# Patient Record
Sex: FEMALE | Race: WHITE | Employment: UNEMPLOYED | URBAN - METROPOLITAN AREA
[De-identification: names, ages, dates, MRNs, and addresses within clinical notes are randomized per-mention and may not be internally consistent; named-entity substitution may affect disease eponyms.]

---

## 2017-03-23 ENCOUNTER — OFFICE VISIT (OUTPATIENT)
Dept: FAMILY MEDICINE CLINIC | Facility: CLINIC | Age: 62
End: 2017-03-23

## 2017-03-23 VITALS
RESPIRATION RATE: 20 BRPM | HEART RATE: 95 BPM | HEIGHT: 63 IN | OXYGEN SATURATION: 98 % | BODY MASS INDEX: 25.69 KG/M2 | DIASTOLIC BLOOD PRESSURE: 62 MMHG | TEMPERATURE: 101 F | SYSTOLIC BLOOD PRESSURE: 102 MMHG | WEIGHT: 145 LBS

## 2017-03-23 DIAGNOSIS — J02.9 ACUTE PHARYNGITIS, UNSPECIFIED ETIOLOGY: ICD-10-CM

## 2017-03-23 DIAGNOSIS — J02.9 SORE THROAT: Primary | ICD-10-CM

## 2017-03-23 DIAGNOSIS — J11.1 INFLUENZA-LIKE ILLNESS: ICD-10-CM

## 2017-03-23 LAB — CONTROL LINE PRESENT WITH A CLEAR BACKGROUND (YES/NO): YES YES/NO

## 2017-03-23 PROCEDURE — 87880 STREP A ASSAY W/OPTIC: CPT | Performed by: NURSE PRACTITIONER

## 2017-03-23 PROCEDURE — 99213 OFFICE O/P EST LOW 20 MIN: CPT | Performed by: NURSE PRACTITIONER

## 2017-03-23 PROCEDURE — 87081 CULTURE SCREEN ONLY: CPT | Performed by: NURSE PRACTITIONER

## 2017-03-23 RX ORDER — AMOXICILLIN 500 MG/1
CAPSULE ORAL
Qty: 20 CAPSULE | Refills: 0 | Status: SHIPPED | OUTPATIENT
Start: 2017-03-23 | End: 2017-03-27 | Stop reason: ALTCHOICE

## 2017-03-24 NOTE — PROGRESS NOTES
CHIEF COMPLAINT:   Patient presents with:  Sinus Problem: sinus pressure,bodyaches, chills,coughing and sore throat x 2 days      HPI:   Yessenia Reid is a 63year old female who presents for upper respiratory symptoms for  2 days.  Patient reports sore t Smoking Status: Never Smoker                      Smokeless Status: Never Used                        Alcohol Use: Yes           1.2 oz/week       2 Standard drinks or equivalent per week        REVIEW OF SYSTEMS:   GENERAL: feels well otherwise, normal - Will cover for strep pending culture due to exam/predominant sore throat. Discussed possibility of [address] if culture returns as neg.  - Comfort care as described in Patient Instructions.   - Discussed potential complications of flu/URIs including pneumon The tonsils and pharynx can become inflamed due to a cold or flu virus. Postnasal drip (excess mucus draining from the nasal cavity) can irritate the throat. It can also make the throat or tonsils more likely to be infected by bacteria.  Severe, untreated t Treatment depends on many factors. What is the likely cause? Is the problem recent? Does it keep coming back? In many cases, the best thing to do is to treat the symptoms, rest, and let the problem heal itself.  Antibiotics may help clear up some bacterial In some cases, tonsils need to be removed. This is often done as outpatient (same-day) surgery. Your healthcare provider may advise removing the tonsils in cases of:  · Several severe bouts of tonsillitis in a year.  “Severe” episodes include those that song · If symptoms are severe, rest at home for the first 2 to 3 days. When you resume activity, don't let yourself get too tired. · Avoid being exposed to cigarette smoke (yours or others’).   · You may use acetaminophen or ibuprofen to control pain and fever, © 4620-5047 [address], [address]. All rights reserved. This information is not intended as a substitute for professional medical care. Always follow your healthcare professional's instructions.

## 2017-03-24 NOTE — PATIENT INSTRUCTIONS
When You Have a Sore Throat  A sore throat can be painful. There are many reasons why you may have a sore throat. Your healthcare provider will work with you to find the cause of your sore throat. He or she will also find the best treatment for you. During the exam, your healthcare provider checks your ears, nose, and throat for problems.  He or she also checks for swelling in the neck, and may listen to your chest.  Possible tests  Other tests your healthcare provider may perform include:  · A throat If your sore throat is due to a bacterial infection, antibiotics may speed healing and prevent complications.  Although group A streptococcus (\"strep throat\" or GAS) is the major treatable infection for a sore throat, GAS causes only 5% to 15% of sore thr © 1035-2774 [address], [address] [place] [employer]. All rights reserved. This information is not intended as a substitute for professional medical care. Always follow your healthcare professional's instructions.         Viral U · Over-the-counter cold medicines will not shorten the length of time you’re sick, but they may be helpful for the following symptoms: cough, sore throat, and nasal and sinus congestion.  (Note: Do not use decongestants if you have high blood pressure.)  Fo

## 2017-03-25 ENCOUNTER — PATIENT MESSAGE (OUTPATIENT)
Dept: FAMILY MEDICINE CLINIC | Facility: CLINIC | Age: 62
End: 2017-03-25

## 2017-03-25 RX ORDER — CODEINE PHOSPHATE AND GUAIFENESIN 10; 100 MG/5ML; MG/5ML
5 SOLUTION ORAL EVERY 6 HOURS PRN
Qty: 120 ML | Refills: 0 | OUTPATIENT
Start: 2017-03-25 | End: 2017-03-27

## 2017-03-25 NOTE — TELEPHONE ENCOUNTER
Okay to send her in Cheratussin AC cough syrup to take prn cough particularly for sleeping. Warn her on drowsy effects.

## 2017-03-25 NOTE — TELEPHONE ENCOUNTER
From: Yessenia Reid  To: Jean Carlos Rodriguez  Sent: 3/25/2017 7:49 AM CDT  Subject: Non-Urgent Medical Question    Dayday Johnson,    I was seen at the walk in clinic Thursday night at the [facility].  The amoxicillin has relieved my burning

## 2017-03-27 ENCOUNTER — TELEPHONE (OUTPATIENT)
Dept: FAMILY MEDICINE CLINIC | Facility: CLINIC | Age: 62
End: 2017-03-27

## 2017-03-27 ENCOUNTER — OFFICE VISIT (OUTPATIENT)
Dept: FAMILY MEDICINE CLINIC | Facility: CLINIC | Age: 62
End: 2017-03-27

## 2017-03-27 VITALS
TEMPERATURE: 99 F | HEART RATE: 80 BPM | SYSTOLIC BLOOD PRESSURE: 110 MMHG | BODY MASS INDEX: 25.87 KG/M2 | WEIGHT: 146 LBS | DIASTOLIC BLOOD PRESSURE: 70 MMHG | RESPIRATION RATE: 16 BRPM | HEIGHT: 63 IN

## 2017-03-27 DIAGNOSIS — R05.9 COUGH: Primary | ICD-10-CM

## 2017-03-27 PROCEDURE — 99213 OFFICE O/P EST LOW 20 MIN: CPT | Performed by: FAMILY MEDICINE

## 2017-03-27 RX ORDER — METHYLPREDNISOLONE 4 MG/1
TABLET ORAL
Qty: 1 KIT | Refills: 0 | Status: SHIPPED | OUTPATIENT
Start: 2017-03-27 | End: 2017-06-14

## 2017-03-27 RX ORDER — ALBUTEROL SULFATE 90 UG/1
AEROSOL, METERED RESPIRATORY (INHALATION)
Qty: 1 INHALER | Refills: 0 | Status: SHIPPED | OUTPATIENT
Start: 2017-03-27 | End: 2017-03-27 | Stop reason: ALTCHOICE

## 2017-03-27 NOTE — PROGRESS NOTES
Naz Hong is a 63year old female. S:  Patient presents today with the following concerns:  · Began about 10 days ago with sore throat and voice sounding funny. Then throat felt like \"on fire\". Cough started and had fever at end of last week. Heart Attack Maternal Grandmother      Transient Ischemic Attack       REVIEW OF SYSTEMS:  GENERAL: feels well otherwise  SKIN: denies any unusual skin lesions  EYES:denies vision change  LUNGS: denies shortness of breath with exertion  CARDIOVASCULAR: den for her. I recommended fluids, steam, rest.  Follow-up if her symptoms worsen or do not improve. Did discuss with her that sometimes ACE inhibitors can cause a dry, irritating cough.   We will keep this in the back of our minds if her symptoms do not im

## 2017-03-27 NOTE — TELEPHONE ENCOUNTER
Spoke to pt and she informed me that [facility] sent a prescription to [facility] for .   However, ProAir is rather pricey and pt is requesting a new prescription to be called into [facility] for ProAir Respiclick that is less expensive and has a manufactu

## 2017-06-14 ENCOUNTER — PATIENT MESSAGE (OUTPATIENT)
Dept: FAMILY MEDICINE CLINIC | Facility: CLINIC | Age: 62
End: 2017-06-14

## 2017-06-14 ENCOUNTER — TELEPHONE (OUTPATIENT)
Dept: FAMILY MEDICINE CLINIC | Facility: CLINIC | Age: 62
End: 2017-06-14

## 2017-06-14 ENCOUNTER — OFFICE VISIT (OUTPATIENT)
Dept: FAMILY MEDICINE CLINIC | Facility: CLINIC | Age: 62
End: 2017-06-14

## 2017-06-14 VITALS
HEIGHT: 64 IN | WEIGHT: 148 LBS | TEMPERATURE: 98 F | BODY MASS INDEX: 25.27 KG/M2 | DIASTOLIC BLOOD PRESSURE: 70 MMHG | RESPIRATION RATE: 16 BRPM | SYSTOLIC BLOOD PRESSURE: 120 MMHG | HEART RATE: 120 BPM

## 2017-06-14 DIAGNOSIS — Z12.31 ENCOUNTER FOR SCREENING MAMMOGRAM FOR BREAST CANCER: Primary | ICD-10-CM

## 2017-06-14 DIAGNOSIS — Z00.00 ROUTINE GENERAL MEDICAL EXAMINATION AT A HEALTH CARE FACILITY: Primary | ICD-10-CM

## 2017-06-14 DIAGNOSIS — J02.9 SORE THROAT: Primary | ICD-10-CM
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
## 2017-06-14 DIAGNOSIS — Z13.29 SCREENING FOR THYROID DISORDER: ICD-10-CM

## 2017-06-14 DIAGNOSIS — E78.1 PURE HYPERGLYCERIDEMIA: ICD-10-CM

## 2017-06-14 DIAGNOSIS — Z13.0 SCREENING FOR DEFICIENCY ANEMIA: ICD-10-CM

## 2017-06-14 DIAGNOSIS — Z13.220 SCREENING FOR LIPOID DISORDERS: ICD-10-CM

## 2017-06-14 DIAGNOSIS — R05.9 COUGH: ICD-10-CM

## 2017-06-14 PROCEDURE — 99214 OFFICE O/P EST MOD 30 MIN: CPT | Performed by: PHYSICIAN ASSISTANT

## 2017-06-14 PROCEDURE — 87880 STREP A ASSAY W/OPTIC: CPT | Performed by: PHYSICIAN ASSISTANT

## 2017-06-14 RX ORDER — METHYLPREDNISOLONE 4 MG/1
TABLET ORAL
Qty: 1 KIT | Refills: 0 | Status: SHIPPED | OUTPATIENT
Start: 2017-06-14 | End: 2017-06-19 | Stop reason: ALTCHOICE

## 2017-06-14 NOTE — TELEPHONE ENCOUNTER
Patient scheduled physical for 8/21 and would like to have labs done prior to appointment. Please place lab orders to [facility].

## 2017-06-14 NOTE — TELEPHONE ENCOUNTER
From: Phil Baca  To: Vinnie Marx PA-C  Sent: 6/14/2017 7:14 AM CDT  Subject: Non-Urgent Medical Question    Good morning Elizabeth,    Once again I have a very sore throat, raspy voice and a cough. Fever today is low grade - 100.8.      Lauren Godwin

## 2017-06-14 NOTE — PATIENT INSTRUCTIONS
Insect, Spider, and Scorpion Bites and Stings  Most insect bites are harmless and cause only minor swelling or itching. But if you’re allergic to insects such as wasps or bees, a sting can cause a life-threatening allergic reaction.  The venom (poison) fr · Apply ice or a cold compress to reduce pain and swelling (keep a thin cloth between the cold source and the skin). Date Last Reviewed: 11/20/2014  © 2933-5953 [address]. All rights reserved.  Robin Zamarripa · Stop smoking or reduce contact with secondhand smoke. Smoke irritates the tender throat lining. · Limit contact with pets and with allergy-causing substances, such as pollen and mold.   · When you’re around someone with a sore throat or cold, wash your h

## 2017-06-14 NOTE — TELEPHONE ENCOUNTER
Lab orders entered at [facility]. Left message on answering machine notifying pt that labs are fasting. Kellie Leahy

## 2017-06-14 NOTE — PROGRESS NOTES
CC:  Patient presents with:  Sore Throat: woke up sunday with sore throat, has been fatigued and feverish since  Insect Bite: a few bites on right arm noticed on sunday would like them inspected      HPI: Claudia Gregory presents with complaints of a ST and a naggin is 25.39 kg/(m^2). Vital signs reviewed.     Constitutional: Vital signs reviewed as noted; well developed, well nourished; in no acute distress  HENT:  Head: Normocephalic, atraumatic  Ears: Normal external ears; canals clear; TMs clear and shiny  Nose: Patient/Caregiver Education: There are no barriers to learning. Medical education done. Outcome: Patient verbalizes understanding. Patient is notified to call with any questions, complications, allergies, or worsening or changing symptoms.   Patient is to c

## 2017-06-19 ENCOUNTER — OFFICE VISIT (OUTPATIENT)
Dept: FAMILY MEDICINE CLINIC | Facility: CLINIC | Age: 62
End: 2017-06-19

## 2017-06-19 VITALS
HEIGHT: 63 IN | DIASTOLIC BLOOD PRESSURE: 80 MMHG | WEIGHT: 147 LBS | BODY MASS INDEX: 26.05 KG/M2 | TEMPERATURE: 98 F | SYSTOLIC BLOOD PRESSURE: 120 MMHG | HEART RATE: 78 BPM | RESPIRATION RATE: 12 BRPM

## 2017-06-19 DIAGNOSIS — J20.9 ACUTE BRONCHITIS, UNSPECIFIED ORGANISM: Primary | ICD-10-CM

## 2017-06-19 PROCEDURE — 99213 OFFICE O/P EST LOW 20 MIN: CPT | Performed by: FAMILY MEDICINE

## 2017-06-19 RX ORDER — BENZONATATE 200 MG/1
200 CAPSULE ORAL 3 TIMES DAILY PRN
Qty: 30 CAPSULE | Refills: 0 | Status: SHIPPED | OUTPATIENT
Start: 2017-06-19 | End: 2017-08-21 | Stop reason: ALTCHOICE

## 2017-06-19 RX ORDER — AZITHROMYCIN 250 MG/1
TABLET, FILM COATED ORAL
Qty: 6 TABLET | Refills: 0 | Status: SHIPPED | OUTPATIENT
Start: 2017-06-19 | End: 2017-08-21 | Stop reason: ALTCHOICE

## 2017-06-19 NOTE — PROGRESS NOTES
Flor Gamboa is a 63year old female. S:  Patient presents today with the following concerns:  · Cough-deep. Feeling tight in chest.  Raspy cough. Nose drains after coughing. Dry cough. Feels fatigued.   Saw Jessica Boyd PA-C last week and put o Heart Attack Paternal Grandfather      AMI   • Cancer Mother      bladder   • Dementia Paternal Grandmother    • Hypertension Father    • Heart Attack Maternal Grandmother      Transient Ischemic Attack       REVIEW OF SYSTEMS:  GENERAL: feels well otherwi

## 2017-07-05 ENCOUNTER — HOSPITAL ENCOUNTER (OUTPATIENT)
Dept: MAMMOGRAPHY | Age: 62
Discharge: HOME | End: 2017-07-05
Attending: FAMILY MEDICINE
Payer: COMMERCIAL

## 2017-07-05 DIAGNOSIS — Z12.31 ENCOUNTER FOR SCREENING MAMMOGRAM FOR BREAST CANCER: ICD-10-CM

## 2017-07-05 PROCEDURE — 77067 SCR MAMMO BI INCL CAD: CPT | Performed by: FAMILY MEDICINE

## 2017-08-16 ENCOUNTER — LAB ENCOUNTER (OUTPATIENT)
Dept: LAB | Age: 62
End: 2017-08-16
Attending: FAMILY MEDICINE
Payer: COMMERCIAL

## 2017-08-16 DIAGNOSIS — Z13.29 SCREENING FOR THYROID DISORDER: ICD-10-CM

## 2017-08-16 DIAGNOSIS — E78.1 PURE HYPERGLYCERIDEMIA: ICD-10-CM

## 2017-08-16 DIAGNOSIS — Z13.0 SCREENING FOR DEFICIENCY ANEMIA: ICD-10-CM

## 2017-08-16 DIAGNOSIS — Z00.00 ROUTINE GENERAL MEDICAL EXAMINATION AT A HEALTH CARE FACILITY: ICD-10-CM

## 2017-08-16 DIAGNOSIS — Z13.220 SCREENING FOR LIPOID DISORDERS: ICD-10-CM

## 2017-08-16 LAB
ALBUMIN SERPL-MCNC: 3.9 G/DL (ref 3.5–4.8)
ALP LIVER SERPL-CCNC: 115 U/L (ref 46–118)
ALT SERPL-CCNC: 33 U/L (ref 14–54)
AST SERPL-CCNC: 27 U/L (ref 15–41)
BASOPHILS # BLD AUTO: 0.06 X10(3) UL (ref 0–0.1)
BASOPHILS NFR BLD AUTO: 0.9 %
BILIRUB SERPL-MCNC: 0.6 MG/DL (ref 0.1–2)
BUN BLD-MCNC: 20 MG/DL (ref 8–20)
CALCIUM BLD-MCNC: 9.6 MG/DL (ref 8.3–10.3)
CHLORIDE: 103 MMOL/L (ref 101–111)
CHOLEST SMN-MCNC: 187 MG/DL (ref ?–200)
CO2: 30 MMOL/L (ref 22–32)
CREAT BLD-MCNC: 1.08 MG/DL (ref 0.55–1.02)
EOSINOPHIL # BLD AUTO: 0.14 X10(3) UL (ref 0–0.3)
EOSINOPHIL NFR BLD AUTO: 2.2 %
ERYTHROCYTE [DISTWIDTH] IN BLOOD BY AUTOMATED COUNT: 13 % (ref 11.5–16)
GLUCOSE BLD-MCNC: 92 MG/DL (ref 70–99)
HCT VFR BLD AUTO: 43.9 % (ref 34–50)
HDLC SERPL-MCNC: 44 MG/DL (ref 45–?)
HDLC SERPL: 4.25 {RATIO} (ref ?–4.44)
HGB BLD-MCNC: 14.1 G/DL (ref 12–16)
IMMATURE GRANULOCYTE COUNT: 0.03 X10(3) UL (ref 0–1)
IMMATURE GRANULOCYTE RATIO %: 0.5 %
LDLC SERPL CALC-MCNC: 107 MG/DL (ref ?–130)
LDLC SERPL-MCNC: 36 MG/DL (ref 5–40)
LYMPHOCYTES # BLD AUTO: 2.42 X10(3) UL (ref 0.9–4)
LYMPHOCYTES NFR BLD AUTO: 37.3 %
M PROTEIN MFR SERPL ELPH: 7.5 G/DL (ref 6.1–8.3)
MCH RBC QN AUTO: 30.1 PG (ref 27–33.2)
MCHC RBC AUTO-ENTMCNC: 32.1 G/DL (ref 31–37)
MCV RBC AUTO: 93.6 FL (ref 81–100)
MONOCYTES # BLD AUTO: 0.32 X10(3) UL (ref 0.1–0.6)
MONOCYTES NFR BLD AUTO: 4.9 %
NEUTROPHIL ABS PRELIM: 3.52 X10 (3) UL (ref 1.3–6.7)
NEUTROPHILS # BLD AUTO: 3.52 X10(3) UL (ref 1.3–6.7)
NEUTROPHILS NFR BLD AUTO: 54.2 %
NONHDLC SERPL-MCNC: 143 MG/DL (ref ?–130)
PLATELET # BLD AUTO: 324 10(3)UL (ref 150–450)
POTASSIUM SERPL-SCNC: 4.2 MMOL/L (ref 3.6–5.1)
RBC # BLD AUTO: 4.69 X10(6)UL (ref 3.8–5.1)
RED CELL DISTRIBUTION WIDTH-SD: 44.1 FL (ref 35.1–46.3)
SODIUM SERPL-SCNC: 140 MMOL/L (ref 136–144)
TRIGLYCERIDES: 178 MG/DL (ref ?–150)
TSI SER-ACNC: 4.25 MIU/ML (ref 0.35–5.5)
WBC # BLD AUTO: 6.5 X10(3) UL (ref 4–13)

## 2017-08-16 PROCEDURE — 80061 LIPID PANEL: CPT | Performed by: FAMILY MEDICINE

## 2017-08-16 PROCEDURE — 80050 GENERAL HEALTH PANEL: CPT | Performed by: FAMILY MEDICINE

## 2017-08-16 PROCEDURE — 36415 COLL VENOUS BLD VENIPUNCTURE: CPT | Performed by: FAMILY MEDICINE

## 2017-08-21 ENCOUNTER — TELEPHONE (OUTPATIENT)
Dept: FAMILY MEDICINE CLINIC | Facility: CLINIC | Age: 62
End: 2017-08-21

## 2017-08-21 ENCOUNTER — OFFICE VISIT (OUTPATIENT)
Dept: FAMILY MEDICINE CLINIC | Facility: CLINIC | Age: 62
End: 2017-08-21

## 2017-08-21 VITALS
SYSTOLIC BLOOD PRESSURE: 120 MMHG | WEIGHT: 146 LBS | DIASTOLIC BLOOD PRESSURE: 78 MMHG | BODY MASS INDEX: 25.55 KG/M2 | TEMPERATURE: 99 F | HEIGHT: 63.5 IN | RESPIRATION RATE: 12 BRPM | HEART RATE: 76 BPM

## 2017-08-21 DIAGNOSIS — Z12.4 ENCOUNTER FOR PAPANICOLAOU SMEAR FOR CERVICAL CANCER SCREENING: ICD-10-CM

## 2017-08-21 DIAGNOSIS — Z01.419 WELL WOMAN EXAM WITH ROUTINE GYNECOLOGICAL EXAM: Primary | ICD-10-CM

## 2017-08-21 DIAGNOSIS — Z11.51 SCREENING FOR HPV (HUMAN PAPILLOMAVIRUS): ICD-10-CM

## 2017-08-21 PROCEDURE — 99396 PREV VISIT EST AGE 40-64: CPT | Performed by: FAMILY MEDICINE

## 2017-08-21 PROCEDURE — 88175 CYTOPATH C/V AUTO FLUID REDO: CPT | Performed by: FAMILY MEDICINE

## 2017-08-21 PROCEDURE — 87624 HPV HI-RISK TYP POOLED RSLT: CPT | Performed by: FAMILY MEDICINE

## 2017-08-21 RX ORDER — LISINOPRIL 20 MG/1
20 TABLET ORAL DAILY
Qty: 90 TABLET | Refills: 3 | Status: SHIPPED | OUTPATIENT
Start: 2017-08-21 | End: 2017-08-21

## 2017-08-21 RX ORDER — LISINOPRIL 20 MG/1
20 TABLET ORAL 2 TIMES DAILY
Qty: 180 TABLET | Refills: 3 | Status: SHIPPED | OUTPATIENT
Start: 2017-08-21 | End: 2018-12-17

## 2017-08-21 RX ORDER — LISINOPRIL 20 MG/1
40 TABLET ORAL DAILY
Qty: 180 TABLET | Refills: 3 | Status: SHIPPED | OUTPATIENT
Start: 2017-08-21 | End: 2017-08-21

## 2017-08-21 NOTE — TELEPHONE ENCOUNTER
Patient Is currently at [facility]. Patient states lisinopril medication was sent incorrectly. Patient takes 20mg twice a day not once. Pt would like to discuss with nurse.

## 2017-08-21 NOTE — TELEPHONE ENCOUNTER
Pt called back to complain that we sent script to wrong pharmacy. Pt wanted to use [facility], not [facility].

## 2017-08-21 NOTE — TELEPHONE ENCOUNTER
Lisinopril script resent with correction. Pt takes a total of 40 mg of Lisinopril per day. Prior script says so.

## 2017-08-21 NOTE — PROGRESS NOTES
:HPI:   Jose Francisco Taylor is a 63year old female who presents for a complete physical exam.     Patient needs refill on lisinopril. Check sore on back.     Seeing dermatologist this afternoon for skin exam.    Wt Readings from Last 3 Encounters:  08/21/17 earache  LUNGS: denies shortness of breath with exertion  CARDIOVASCULAR: denies chest pain on exertion  GI: denies abdominal pain,denies heartburn  : denies dysuria, vaginal discharge or itching  MUSCULOSKELETAL: denies back pain  NEURO: denies headache 56 (L) 08/16/2017 07:44 AM   CA 9.6 08/16/2017 07:44 AM   ALKPHO 115 08/16/2017 07:44 AM   AST 27 08/16/2017 07:44 AM   ALT 33 08/16/2017 07:44 AM   BILT 0.6 08/16/2017 07:44 AM   TP 7.5 08/16/2017 07:44 AM   ALB 3.9 08/16/2017 07:44 AM    08/16/2017

## 2017-08-22 LAB — HPV I/H RISK 1 DNA SPEC QL NAA+PROBE: NEGATIVE

## 2017-10-30 ENCOUNTER — OFFICE VISIT (OUTPATIENT)
Dept: FAMILY MEDICINE CLINIC | Facility: CLINIC | Age: 62
End: 2017-10-30

## 2017-10-30 VITALS
WEIGHT: 146.63 LBS | BODY MASS INDEX: 25.98 KG/M2 | HEART RATE: 88 BPM | RESPIRATION RATE: 18 BRPM | DIASTOLIC BLOOD PRESSURE: 82 MMHG | TEMPERATURE: 98 F | HEIGHT: 63 IN | SYSTOLIC BLOOD PRESSURE: 132 MMHG | OXYGEN SATURATION: 99 %

## 2017-10-30 DIAGNOSIS — J01.00 ACUTE MAXILLARY SINUSITIS, RECURRENCE NOT SPECIFIED: Primary | ICD-10-CM

## 2017-10-30 PROCEDURE — 99213 OFFICE O/P EST LOW 20 MIN: CPT | Performed by: NURSE PRACTITIONER

## 2017-10-30 RX ORDER — AMOXICILLIN 875 MG/1
875 TABLET, COATED ORAL 2 TIMES DAILY
Qty: 20 TABLET | Refills: 0 | Status: SHIPPED | OUTPATIENT
Start: 2017-10-30 | End: 2018-06-10

## 2017-10-30 NOTE — PROGRESS NOTES
CHIEF COMPLAINT:   Patient presents with:  Post Nasal Drip: post nasal drip, sneezing, coughing green mucous, sore throat x3 days Pt taking Mucinex DM      HPI:   Melani Casper is a 63year old female who presents for cold symptoms for  3  days.  Pt c/o S Alcohol use: Yes           1.2 oz/week     Standard drinks or equivalent: 2 per week        REVIEW OF SYSTEMS:   GENERAL:  normal appetite  SKIN: no rashes or abnormal skin lesions  HEENT: See HPI.     LUNGS: denies shortness of breath or wheezing, See HPI Patient Instructions       Self-Care for Sinusitis     Drinking plenty of water can help sinuses drain. Sinusitis can often be managed with self-care. Self-care can keep sinuses moist and make you feel more comfortable.  Remember to follow your doctor's i The patient indicates understanding of these issues and agrees to the plan. The patient is asked to return if sx's persist or worsen.

## 2018-06-10 ENCOUNTER — OFFICE VISIT (OUTPATIENT)
Dept: FAMILY MEDICINE CLINIC | Facility: CLINIC | Age: 63
End: 2018-06-10

## 2018-06-10 VITALS
OXYGEN SATURATION: 98 % | SYSTOLIC BLOOD PRESSURE: 138 MMHG | HEIGHT: 63 IN | TEMPERATURE: 99 F | DIASTOLIC BLOOD PRESSURE: 74 MMHG | RESPIRATION RATE: 20 BRPM

## 2018-06-10 DIAGNOSIS — M25.561 ACUTE PAIN OF RIGHT KNEE: ICD-10-CM

## 2018-06-10 DIAGNOSIS — H69.83 DYSFUNCTION OF BOTH EUSTACHIAN TUBES: Primary | ICD-10-CM

## 2018-06-10 DIAGNOSIS — J01.00 ACUTE MAXILLARY SINUSITIS, RECURRENCE NOT SPECIFIED: ICD-10-CM

## 2018-06-10 PROCEDURE — 99213 OFFICE O/P EST LOW 20 MIN: CPT | Performed by: FAMILY MEDICINE

## 2018-06-10 RX ORDER — AMOXICILLIN 875 MG/1
875 TABLET, COATED ORAL 2 TIMES DAILY
Qty: 20 TABLET | Refills: 0 | Status: SHIPPED | OUTPATIENT
Start: 2018-06-10 | End: 2018-06-20

## 2018-06-10 NOTE — PATIENT INSTRUCTIONS
Take Claritin 10 mg once daily along with Mucinex (guaifenesin) to help thin the mucous. Increase fluids, rest.  Preventing Sinusitis    Colds, flu, and allergies make it more likely for you to get sinusitis.  Do your best to prevent sinusitis by prevent · Drink several glasses of water a day. · Stay away from drying beverages such as alcohol and coffee. · Stay away from all types of smoke, which dries out sinus linings. This includes tobacco smoke and chemical smoke in workplace settings.   · Use saltwat

## 2018-06-10 NOTE — PROGRESS NOTES
Roseline Major is a 63year old female. S:  Patient presents today with the following concerns:  · Woke up with a bad headache and bilateral ear clogging. Sore throat. No fevers. PND. Fatigued. No N/V/D. Appetite okay.   No bodyaches or joint pain lesions  EYES:denies vision change  LUNGS: denies shortness of breath with exertion  CARDIOVASCULAR: denies chest pain on exertion  GI: denies abdominal pain. No N/V/D/C  : denies dysuria  MUSCULOSKELETAL: right knee pain.   NEURO: denies headaches    EX Unable to take NSAIDs. Name of Dr. Melly Rizzo, [facility], given to patient.  (she is a patient in my family practice)  She will see him if symptoms do not improve or if they worsen over the next 2 weeks.   Patient verbalizes understanding of treatment

## 2018-07-19 ENCOUNTER — MOBILE ENCOUNTER (OUTPATIENT)
Dept: FAMILY MEDICINE CLINIC | Facility: CLINIC | Age: 63
End: 2018-07-19

## 2018-07-20 ENCOUNTER — OFFICE VISIT (OUTPATIENT)
Dept: FAMILY MEDICINE CLINIC | Facility: CLINIC | Age: 63
End: 2018-07-20
Payer: COMMERCIAL

## 2018-07-20 VITALS
SYSTOLIC BLOOD PRESSURE: 108 MMHG | DIASTOLIC BLOOD PRESSURE: 70 MMHG | RESPIRATION RATE: 14 BRPM | TEMPERATURE: 99 F | HEART RATE: 108 BPM | HEIGHT: 64 IN | WEIGHT: 146.81 LBS | BODY MASS INDEX: 25.06 KG/M2

## 2018-07-20 DIAGNOSIS — T50.Z95A IMMUNIZATION REACTION, INITIAL ENCOUNTER: Primary | ICD-10-CM

## 2018-07-20 DIAGNOSIS — M25.512 ACUTE PAIN OF LEFT SHOULDER: ICD-10-CM

## 2018-07-20 PROCEDURE — 99213 OFFICE O/P EST LOW 20 MIN: CPT | Performed by: FAMILY MEDICINE

## 2018-07-20 NOTE — PROGRESS NOTES
Patient presents with:  Imm/Inj: reaction possibly to Shingirx - given at [employer] yesterday- pain and limit in ROM affected- chills -fever     HPI:   Flor Gamboa is a 63year old female who presents to the office for reaction after shot.   Got shingle

## 2018-07-20 NOTE — PROGRESS NOTES
Pt calling output patient line re arm pain. Had #2 shingles vaccine at [place] and now pain in the L upper arm. Not able to lift arm.  No redness no swelling normal arm sensation and  strength    Advised to ice arm rest arm Tylenol  If persists can ca

## 2018-07-25 ENCOUNTER — PATIENT MESSAGE (OUTPATIENT)
Dept: FAMILY MEDICINE CLINIC | Facility: CLINIC | Age: 63
End: 2018-07-25

## 2018-07-25 DIAGNOSIS — Z12.31 SCREENING MAMMOGRAM, ENCOUNTER FOR: Primary | ICD-10-CM

## 2018-07-25 NOTE — TELEPHONE ENCOUNTER
From: Phil Baca  To: Vinnie Marx PA-C  Sent: 7/25/2018 9:29 AM CDT  Subject: Other    Dayday Johnson,    Can you please put in an order for a mammogram for myself. Would like to get this out of the way before I leave for [place].  It's been over a year

## 2018-07-31 ENCOUNTER — HOSPITAL ENCOUNTER (OUTPATIENT)
Dept: MAMMOGRAPHY | Age: 63
Discharge: HOME | End: 2018-07-31
Attending: FAMILY MEDICINE
Payer: COMMERCIAL

## 2018-07-31 DIAGNOSIS — Z12.31 SCREENING MAMMOGRAM, ENCOUNTER FOR: ICD-10-CM

## 2018-07-31 PROCEDURE — 77067 SCR MAMMO BI INCL CAD: CPT | Performed by: FAMILY MEDICINE

## 2018-07-31 PROCEDURE — 77063 BREAST TOMOSYNTHESIS BI: CPT | Performed by: FAMILY MEDICINE

## 2018-08-10 ENCOUNTER — MED REC SCAN ONLY (OUTPATIENT)
Dept: FAMILY MEDICINE CLINIC | Facility: CLINIC | Age: 63
End: 2018-08-10

## 2018-08-14 ENCOUNTER — PATIENT MESSAGE (OUTPATIENT)
Dept: FAMILY MEDICINE CLINIC | Facility: CLINIC | Age: 63
End: 2018-08-14

## 2018-08-14 DIAGNOSIS — M25.512 ACUTE PAIN OF LEFT SHOULDER: Primary | ICD-10-CM

## 2018-08-14 DIAGNOSIS — M79.602 LEFT ARM PAIN: ICD-10-CM

## 2018-08-14 NOTE — TELEPHONE ENCOUNTER
From: Jose Francisco Taylor  To: Bindu Skinner MD  Sent: 8/14/2018 10:02 AM CDT  Subject: Visit Pako Wharton,    The pain in my left arm and shoulder continue from the shingles vaccine.  It hurts to put on clothes over my head, use t

## 2018-08-14 NOTE — PROGRESS NOTES
Yes, as discussed PT was the next step, the order has been signed. Can we let her know to call and schedule?

## 2018-09-04 ENCOUNTER — TELEPHONE (OUTPATIENT)
Dept: FAMILY MEDICINE CLINIC | Facility: CLINIC | Age: 63
End: 2018-09-04

## 2018-09-04 DIAGNOSIS — Z13.220 SCREENING FOR LIPOID DISORDERS: ICD-10-CM

## 2018-09-04 DIAGNOSIS — Z00.00 LABORATORY EXAMINATION ORDERED AS PART OF A ROUTINE GENERAL MEDICAL EXAMINATION: Primary | ICD-10-CM

## 2018-09-04 DIAGNOSIS — E78.1 PURE HYPERGLYCERIDEMIA: ICD-10-CM

## 2018-09-04 NOTE — TELEPHONE ENCOUNTER
Labs placed to THE [facility] as requested called patient LMOM to call back

## 2018-09-07 ENCOUNTER — APPOINTMENT (OUTPATIENT)
Dept: PHYSICAL THERAPY | Age: 63
End: 2018-09-07
Attending: FAMILY MEDICINE
Payer: COMMERCIAL

## 2018-09-11 ENCOUNTER — OFFICE VISIT (OUTPATIENT)
Dept: PHYSICAL THERAPY | Age: 63
End: 2018-09-11
Attending: FAMILY MEDICINE
Payer: COMMERCIAL

## 2018-09-11 DIAGNOSIS — M79.602 LEFT ARM PAIN: ICD-10-CM

## 2018-09-11 DIAGNOSIS — M25.512 ACUTE PAIN OF LEFT SHOULDER: ICD-10-CM

## 2018-09-11 PROCEDURE — 97140 MANUAL THERAPY 1/> REGIONS: CPT

## 2018-09-11 PROCEDURE — 97161 PT EVAL LOW COMPLEX 20 MIN: CPT

## 2018-09-11 NOTE — PROGRESS NOTES
UPPER EXTREMITY EVALUATION:   Referring Physician: Dr. Jackelin Morales  Diagnosis:  L shoulder pain  Date of Service: 9/11/2018     PATIENT Jose Cruz Busby is a 63year old y/o female who presents to therapy today with complaints of L shoulder of shoulder abd   IR: R WFL; L to L5 behind the back   [facility]      PROM:   Shoulder  Elbow   Flexion: R WFL; L 150  Abduction: R WFL; L 150  ER: R WFl; L 85   IR: R WFl; L to body in supine with 45 degs of shoulder abd  WFL      Accessory motion:  Minimal G trap strength to 5/5 to promote improved shoulder mechanics and stabilization with ADL such as lifting and reaching (10 visits)  · Pt will be independent and compliant with comprehensive HEP to maintain progress achieved in PT (10 visits)    Irma / Mayra Morocho

## 2018-09-14 ENCOUNTER — OFFICE VISIT (OUTPATIENT)
Dept: PHYSICAL THERAPY | Age: 63
End: 2018-09-14
Attending: FAMILY MEDICINE
Payer: COMMERCIAL

## 2018-09-14 PROCEDURE — 97140 MANUAL THERAPY 1/> REGIONS: CPT

## 2018-09-14 PROCEDURE — 97110 THERAPEUTIC EXERCISES: CPT

## 2018-09-18 ENCOUNTER — OFFICE VISIT (OUTPATIENT)
Dept: PHYSICAL THERAPY | Age: 63
End: 2018-09-18
Attending: FAMILY MEDICINE
Payer: COMMERCIAL

## 2018-09-18 PROCEDURE — 97140 MANUAL THERAPY 1/> REGIONS: CPT

## 2018-09-18 PROCEDURE — 97110 THERAPEUTIC EXERCISES: CPT

## 2018-09-18 NOTE — PROGRESS NOTES
Dx:  L shoulder pain- following shot lateral deltoid      Authorized # of Visits:  10         Next MD visit: none scheduled  Fall Risk: standard         Precautions: n/a             Subjective:  4/10 with movement. I maybe over did it over the weekend.  My Cont with PT for POC for R shoulder strengthening - assess taping again next appt and if her symptoms improved   Date: 9/14/2018 TX#: 2/ 10  Date:               TX#: 3/10  9/18/18   Date:               TX#: 4/ Date:               TX#: 5/ Date:

## 2018-09-20 ENCOUNTER — APPOINTMENT (OUTPATIENT)
Dept: PHYSICAL THERAPY | Age: 63
End: 2018-09-20
Attending: FAMILY MEDICINE
Payer: COMMERCIAL

## 2018-09-25 ENCOUNTER — OFFICE VISIT (OUTPATIENT)
Dept: PHYSICAL THERAPY | Age: 63
End: 2018-09-25
Attending: FAMILY MEDICINE
Payer: COMMERCIAL

## 2018-09-25 PROCEDURE — 97110 THERAPEUTIC EXERCISES: CPT

## 2018-09-25 PROCEDURE — 97140 MANUAL THERAPY 1/> REGIONS: CPT

## 2018-09-25 NOTE — PROGRESS NOTES
Dx:  L shoulder pain- following shot lateral deltoid      Authorized # of Visits:  10         Next MD visit: none scheduled  Fall Risk: standard         Precautions: n/a             Subjective:   Very sore after last appt.  Bruising noted where Lalo acevedo at that time.    Date: 9/14/2018 TX#: 2/ 10  Date:               TX#: 3/10  9/18/18   Date:               TX#: 4/10  9/24/18 Date:               TX#: 5/ Date:               TX#: 6/ Date:               TX#: 7/ Date:               TX#: 8/ STM L post shoulde

## 2018-09-27 ENCOUNTER — APPOINTMENT (OUTPATIENT)
Dept: PHYSICAL THERAPY | Age: 63
End: 2018-09-27
Attending: FAMILY MEDICINE
Payer: COMMERCIAL

## 2018-10-02 ENCOUNTER — APPOINTMENT (OUTPATIENT)
Dept: PHYSICAL THERAPY | Age: 63
End: 2018-10-02
Attending: FAMILY MEDICINE
Payer: COMMERCIAL

## 2018-10-04 ENCOUNTER — APPOINTMENT (OUTPATIENT)
Dept: PHYSICAL THERAPY | Age: 63
End: 2018-10-04
Attending: FAMILY MEDICINE
Payer: COMMERCIAL

## 2018-10-12 ENCOUNTER — OFFICE VISIT (OUTPATIENT)
Dept: PHYSICAL THERAPY | Age: 63
End: 2018-10-12
Attending: FAMILY MEDICINE
Payer: COMMERCIAL

## 2018-10-12 PROCEDURE — 97110 THERAPEUTIC EXERCISES: CPT

## 2018-10-12 PROCEDURE — 97140 MANUAL THERAPY 1/> REGIONS: CPT

## 2018-10-12 NOTE — PROGRESS NOTES
Dx:  L shoulder pain- following shot lateral deltoid      Authorized # of Visits:  10         Next MD visit: none scheduled  Fall Risk: standard         Precautions: n/a             Subjective:    Still having the same pain in the L lateral shoulder.  Pt ha mechanics and stabilization with ADL such as lifting and reaching (10 visits)  · Pt will be independent and compliant with comprehensive HEP to maintain progress achieved in PT (10 visits)    Plan:   See pt on Tuesday.  Pt will follow up with Dr. Irvin Lopes on Th

## 2018-10-16 ENCOUNTER — APPOINTMENT (OUTPATIENT)
Dept: PHYSICAL THERAPY | Age: 63
End: 2018-10-16
Attending: FAMILY MEDICINE
Payer: COMMERCIAL

## 2018-10-18 ENCOUNTER — APPOINTMENT (OUTPATIENT)
Dept: PHYSICAL THERAPY | Age: 63
End: 2018-10-18
Attending: FAMILY MEDICINE
Payer: COMMERCIAL

## 2018-10-23 ENCOUNTER — OFFICE VISIT (OUTPATIENT)
Dept: PHYSICAL THERAPY | Age: 63
End: 2018-10-23
Attending: FAMILY MEDICINE
Payer: COMMERCIAL

## 2018-10-23 PROCEDURE — 97140 MANUAL THERAPY 1/> REGIONS: CPT

## 2018-10-23 PROCEDURE — 97110 THERAPEUTIC EXERCISES: CPT

## 2018-10-23 NOTE — PROGRESS NOTES
Dx:  L shoulder pain- following shot lateral deltoid      Authorized # of Visits:  10         Next MD visit: none scheduled  Fall Risk: standard         Precautions: n/a             Subjective:    Had cortisone injection into my L shoulder.  Not feeling a l TX#: 5/10  10/12/18 Date:               TX#: 6/10  10/23/18 Date:               TX#: 7/ Date:               TX#: 8/   STM L post shoulder 5 mins  US 5 mins 1.5   PROm R shoulder 15 x each plane STM L post lateral shoulder with manual and tool

## 2018-11-02 ENCOUNTER — APPOINTMENT (OUTPATIENT)
Dept: PHYSICAL THERAPY | Age: 63
End: 2018-11-02
Attending: FAMILY MEDICINE
Payer: COMMERCIAL

## 2018-11-07 ENCOUNTER — APPOINTMENT (OUTPATIENT)
Dept: PHYSICAL THERAPY | Age: 63
End: 2018-11-07
Attending: FAMILY MEDICINE
Payer: COMMERCIAL

## 2018-11-13 ENCOUNTER — APPOINTMENT (OUTPATIENT)
Dept: PHYSICAL THERAPY | Age: 63
End: 2018-11-13
Attending: FAMILY MEDICINE
Payer: COMMERCIAL

## 2018-11-20 ENCOUNTER — APPOINTMENT (OUTPATIENT)
Dept: PHYSICAL THERAPY | Age: 63
End: 2018-11-20
Attending: FAMILY MEDICINE
Payer: COMMERCIAL

## 2018-11-27 ENCOUNTER — APPOINTMENT (OUTPATIENT)
Dept: PHYSICAL THERAPY | Age: 63
End: 2018-11-27
Attending: FAMILY MEDICINE
Payer: COMMERCIAL

## 2018-12-04 ENCOUNTER — APPOINTMENT (OUTPATIENT)
Dept: PHYSICAL THERAPY | Age: 63
End: 2018-12-04
Attending: FAMILY MEDICINE
Payer: COMMERCIAL

## 2018-12-11 ENCOUNTER — APPOINTMENT (OUTPATIENT)
Dept: PHYSICAL THERAPY | Age: 63
End: 2018-12-11
Attending: FAMILY MEDICINE
Payer: COMMERCIAL

## 2018-12-12 ENCOUNTER — TELEPHONE (OUTPATIENT)
Dept: FAMILY MEDICINE CLINIC | Facility: CLINIC | Age: 63
End: 2018-12-12

## 2018-12-12 NOTE — TELEPHONE ENCOUNTER
Received medical records request from P[facility]y [facility]. requesting patient's medical records from 07/19/18 to present.  Records were printed and faxed to [facility] at [phone]

## 2018-12-17 ENCOUNTER — OFFICE VISIT (OUTPATIENT)
Dept: FAMILY MEDICINE CLINIC | Facility: CLINIC | Age: 63
End: 2018-12-17
Payer: COMMERCIAL

## 2018-12-17 VITALS
SYSTOLIC BLOOD PRESSURE: 130 MMHG | OXYGEN SATURATION: 99 % | WEIGHT: 149 LBS | HEART RATE: 61 BPM | RESPIRATION RATE: 14 BRPM | BODY MASS INDEX: 28.5 KG/M2 | DIASTOLIC BLOOD PRESSURE: 78 MMHG | HEIGHT: 60.5 IN | TEMPERATURE: 98 F

## 2018-12-17 DIAGNOSIS — M25.512 LEFT SHOULDER PAIN, UNSPECIFIED CHRONICITY: ICD-10-CM

## 2018-12-17 DIAGNOSIS — Z01.419 WELL WOMAN EXAM: Primary | ICD-10-CM

## 2018-12-17 DIAGNOSIS — M25.561 RIGHT KNEE PAIN, UNSPECIFIED CHRONICITY: ICD-10-CM

## 2018-12-17 DIAGNOSIS — Z12.11 SCREENING FOR COLON CANCER: ICD-10-CM

## 2018-12-17 PROCEDURE — 99396 PREV VISIT EST AGE 40-64: CPT | Performed by: FAMILY MEDICINE

## 2018-12-17 RX ORDER — LISINOPRIL 20 MG/1
20 TABLET ORAL 2 TIMES DAILY
Qty: 180 TABLET | Refills: 3 | Status: SHIPPED | OUTPATIENT
Start: 2018-12-17 | End: 2020-10-19

## 2018-12-18 ENCOUNTER — LAB ENCOUNTER (OUTPATIENT)
Dept: LAB | Age: 63
End: 2018-12-18
Attending: FAMILY MEDICINE
Payer: COMMERCIAL

## 2018-12-18 DIAGNOSIS — Z00.00 LABORATORY EXAMINATION ORDERED AS PART OF A ROUTINE GENERAL MEDICAL EXAMINATION: ICD-10-CM

## 2018-12-18 DIAGNOSIS — Z13.220 SCREENING FOR LIPOID DISORDERS: ICD-10-CM

## 2018-12-18 DIAGNOSIS — E78.1 PURE HYPERGLYCERIDEMIA: ICD-10-CM

## 2018-12-18 PROCEDURE — 80061 LIPID PANEL: CPT | Performed by: FAMILY MEDICINE

## 2018-12-18 PROCEDURE — 36415 COLL VENOUS BLD VENIPUNCTURE: CPT | Performed by: FAMILY MEDICINE

## 2018-12-18 PROCEDURE — 85025 COMPLETE CBC W/AUTO DIFF WBC: CPT | Performed by: FAMILY MEDICINE

## 2018-12-18 PROCEDURE — 80053 COMPREHEN METABOLIC PANEL: CPT | Performed by: FAMILY MEDICINE

## 2018-12-18 NOTE — PROGRESS NOTES
:HPI:   Skye Chery is a 66year old female who presents for a complete physical exam.     Patient complains of left shoulder pain since having Shingrix vaccination at [address] in the left deltoid. She believes pharmacist put in too high.   Had pain w DIAGNOSTIC N/A 12/30/2013    Performed by Liudmila Rhoades MD at [facility] MAIN OR   • OTHER SURGICAL HISTORY  9/2004    renal surgery   • TUBAL LIGATION  1998    Done @[place]       Family History   Problem Relation Age of Onset   • Heart Attack Maternal [place] Zapien EOMI,conjunctiva are clear  NECK: supple,no adenopathy, no thyromegaly, no bruits  BREASTS:  deferred  LUNGS: clear to auscultation bilaterally, no wheezing, rhonchi or rales   CARDIO: RRR S1 S2 without murmur  GI: abdomen soft, non tender, no organomegaly woman exam    Right knee pain, unspecified chronicity    Left shoulder pain, unspecified chronicity    Screening for colon cancer  -     EVALUATE & TREAT, GASTRO (INTERNAL)    Other orders  -     lisinopril 20 MG Oral Tab;  Take 1 tablet (20 mg total) by mo

## 2018-12-19 ENCOUNTER — TELEPHONE (OUTPATIENT)
Dept: FAMILY MEDICINE CLINIC | Facility: CLINIC | Age: 63
End: 2018-12-19

## 2018-12-19 DIAGNOSIS — S83.241A TEAR OF MEDIAL MENISCUS OF RIGHT KNEE, CURRENT, UNSPECIFIED TEAR TYPE, INITIAL ENCOUNTER: Primary | ICD-10-CM

## 2018-12-19 DIAGNOSIS — M22.41 CHONDROMALACIA OF RIGHT PATELLA: ICD-10-CM

## 2018-12-20 ENCOUNTER — TELEPHONE (OUTPATIENT)
Dept: FAMILY MEDICINE CLINIC | Facility: CLINIC | Age: 63
End: 2018-12-20

## 2018-12-20 DIAGNOSIS — M75.02 ADHESIVE CAPSULITIS OF LEFT SHOULDER: Primary | ICD-10-CM

## 2018-12-20 DIAGNOSIS — M75.42 SHOULDER IMPINGEMENT SYNDROME, LEFT: ICD-10-CM

## 2018-12-20 NOTE — TELEPHONE ENCOUNTER
Referral request Dr. Mona Reeder M.D. Patient seen by Parvin Starr PA-C 12/17/18  Office notes from Parvin Starr PA-C 12/17/18  Also complaining of right knee pain, ongoing. Had x-ray and cortisone injection with ortho. Did not help much.   L

## 2018-12-20 NOTE — TELEPHONE ENCOUNTER
MRI of the right knee:  Posterior horn medial meniscal root tear with extrusion of the medial meniscal body. Mild patellar chondromalacia.   Small to moderate joint effusion with a small Baker's cyst.      Patient is referred to Madeline Abbott MD, [facility]

## 2018-12-20 NOTE — TELEPHONE ENCOUNTER
Patient notified that referral for Dr. Aurea Butterfield has been placed. She is awaiting the MRI of the left shoulder. Please let patient know results when available. She has also asked for copies of both reports.   I told patient we would be happy to give her a

## 2019-01-08 ENCOUNTER — TELEPHONE (OUTPATIENT)
Dept: NEPHROLOGY | Facility: CLINIC | Age: 64
End: 2019-01-08

## 2019-01-08 ENCOUNTER — TELEPHONE (OUTPATIENT)
Dept: FAMILY MEDICINE CLINIC | Facility: CLINIC | Age: 64
End: 2019-01-08

## 2019-01-08 NOTE — TELEPHONE ENCOUNTER
Received fax for Pre op paperwork for pt Rightn knee surgery on 1/24/19 at [facility] Dr. Tarry Hashimoto. Patient needs H&P, Labs, EKG, and Chest x-ray. Appt scheduled with Dr. Alysa Esparza on 1/11/19 at 2:30 pm.    Paperwork in triage.

## 2019-01-09 ENCOUNTER — LAB ENCOUNTER (OUTPATIENT)
Dept: LAB | Age: 64
End: 2019-01-09
Attending: ORTHOPAEDIC SURGERY
Payer: COMMERCIAL

## 2019-01-09 ENCOUNTER — HOSPITAL ENCOUNTER (OUTPATIENT)
Dept: GENERAL RADIOLOGY | Age: 64
Discharge: HOME | End: 2019-01-09
Attending: ORTHOPAEDIC SURGERY
Payer: COMMERCIAL

## 2019-01-09 DIAGNOSIS — Z01.818 PRE-OPERATIVE CLEARANCE: ICD-10-CM

## 2019-01-09 DIAGNOSIS — Z01.812 ENCOUNTER FOR PRE-OPERATIVE LABORATORY TESTING: Primary | ICD-10-CM

## 2019-01-09 LAB
BILIRUB UR QL STRIP.AUTO: NEGATIVE
CLARITY UR REFRACT.AUTO: CLEAR
COLOR UR AUTO: COLORLESS
GLUCOSE UR STRIP.AUTO-MCNC: NEGATIVE MG/DL
KETONES UR STRIP.AUTO-MCNC: NEGATIVE MG/DL
LEUKOCYTE ESTERASE UR QL STRIP.AUTO: NEGATIVE
NITRITE UR QL STRIP.AUTO: NEGATIVE
PH UR STRIP.AUTO: 6 [PH] (ref 4.5–8)
PROT UR STRIP.AUTO-MCNC: NEGATIVE MG/DL
RBC UR QL AUTO: NEGATIVE
SP GR UR STRIP.AUTO: <1.005 (ref 1–1.03)
UROBILINOGEN UR STRIP.AUTO-MCNC: <2 MG/DL

## 2019-01-09 PROCEDURE — 87086 URINE CULTURE/COLONY COUNT: CPT

## 2019-01-09 PROCEDURE — 71046 X-RAY EXAM CHEST 2 VIEWS: CPT | Performed by: ORTHOPAEDIC SURGERY

## 2019-01-09 PROCEDURE — 81003 URINALYSIS AUTO W/O SCOPE: CPT

## 2019-01-09 NOTE — TELEPHONE ENCOUNTER
Called Dr Pinedo Me office because none of the labs were checked as being necessary [facility] for them to call back to see just what she need's for pre op

## 2019-01-11 ENCOUNTER — OFFICE VISIT (OUTPATIENT)
Dept: FAMILY MEDICINE CLINIC | Facility: CLINIC | Age: 64
End: 2019-01-11
Payer: COMMERCIAL

## 2019-01-11 VITALS
DIASTOLIC BLOOD PRESSURE: 78 MMHG | SYSTOLIC BLOOD PRESSURE: 138 MMHG | HEIGHT: 62.75 IN | HEART RATE: 80 BPM | WEIGHT: 149 LBS | BODY MASS INDEX: 26.74 KG/M2 | TEMPERATURE: 98 F | RESPIRATION RATE: 16 BRPM

## 2019-01-11 DIAGNOSIS — S83.206D TEAR OF MENISCUS OF RIGHT KNEE AS CURRENT INJURY, UNSPECIFIED MENISCUS, UNSPECIFIED TEAR TYPE, SUBSEQUENT ENCOUNTER: ICD-10-CM

## 2019-01-11 DIAGNOSIS — Z01.818 PREOP EXAM FOR INTERNAL MEDICINE: Primary | ICD-10-CM

## 2019-01-11 PROCEDURE — 99243 OFF/OP CNSLTJ NEW/EST LOW 30: CPT | Performed by: FAMILY MEDICINE

## 2019-01-11 PROCEDURE — 93000 ELECTROCARDIOGRAM COMPLETE: CPT | Performed by: FAMILY MEDICINE

## 2019-01-11 NOTE — PROGRESS NOTES
Payor:  BELLE ISIDRO PPO / Plan: BELLE CHOICE POS / Product Type: POS /     Subjective:  HPI:  66year old female who has a past medical history of HIGH BLOOD PRESSURE, Hypertension, Lipid screening (11/28/2011), OTHER DISEASES, Pap smear for cervical cancer scre and skiing (>10 METs)    ROS: Denies fever/chills, sleep problems, HA, vision changes, dizziness, lightheadedness, rhinorrhea, SOB, cough, difficulty swallowing, CP, edema, N/V, abd pain, diarrhea, constipation, hematochezia, dysuria, frequency, urgency, h current facility-administered medications on file prior to visit.    No Known Allergies    OBJECTIVE:     01/11/19  1424   BP: 138/78   Pulse: 80   Resp: 16   Temp: 97.7 °F (36.5 °C)   TempSrc: Oral   Weight: 149 lb   Height: 62.75\"     Body mass index is Absolute      0.00 - 0.10 x10(3) uL 0.05   Immature Granulocyte Absolute      0.00 - 1.00 x10(3) uL 0.03   Neutrophils %      % 64.1   Lymphocytes %      % 28.0   Monocytes %      % 5.2   Eosinophils %      % 1.6   Basophils %      % 0.7   Immature Granulo =====  CONCLUSION:  Negative    Assessment and Plan:  Raman Sorto was seen today for pre-op exam.    Diagnoses and all orders for this visit:    Preop exam for internal medicine    Tear of meniscus of right knee as current injury, unspecified meniscus, unspeci

## 2019-05-01 ENCOUNTER — TELEPHONE (OUTPATIENT)
Dept: FAMILY MEDICINE CLINIC | Facility: CLINIC | Age: 64
End: 2019-05-01

## 2019-05-01 NOTE — TELEPHONE ENCOUNTER
Spoke to patient, patient requested to keep Dr. Castellano Cleveritzel as her pcp. Patient was seen by Dr. Jennifer Santamaria for pre op because she was not able to see Dr. Castellano Cleveritzel due to schedule being full.  Patient normally sees Elizabeth Torres and I advised her she will need to sche

## 2019-07-01 ENCOUNTER — PATIENT MESSAGE (OUTPATIENT)
Dept: FAMILY MEDICINE CLINIC | Facility: CLINIC | Age: 64
End: 2019-07-01

## 2019-07-01 DIAGNOSIS — Z12.31 ENCOUNTER FOR SCREENING MAMMOGRAM FOR BREAST CANCER: Primary | ICD-10-CM

## 2019-07-01 NOTE — TELEPHONE ENCOUNTER
From: William Filter  To: Edmond Zavaleta  Sent: 7/1/2019 11:32 AM CDT  Subject: Other    Hi Elizabeth,    Can you write an order for my annual mammogram?    Thanks.     Parvin Payton

## 2019-07-23 DIAGNOSIS — I10 ESSENTIAL HYPERTENSION: Primary | ICD-10-CM

## 2019-07-23 DIAGNOSIS — N02.8 IGA NEPHROPATHY: ICD-10-CM

## 2019-07-30 ENCOUNTER — TELEPHONE (OUTPATIENT)
Dept: FAMILY MEDICINE CLINIC | Facility: CLINIC | Age: 64
End: 2019-07-30

## 2019-07-30 DIAGNOSIS — Z00.00 LABORATORY EXAM ORDERED AS PART OF ROUTINE GENERAL MEDICAL EXAMINATION: Primary | ICD-10-CM

## 2019-07-30 NOTE — TELEPHONE ENCOUNTER
Please enter lab orders for the patient's upcoming physical appointment. Physical scheduled: 11/20/2019     Preferred lab: [facility]     The patient has been notified to complete fasting labs prior to their physical appointment.

## 2019-09-05 PROBLEM — D12.2 BENIGN NEOPLASM OF ASCENDING COLON: Status: ACTIVE | Noted: 2019-09-05

## 2019-09-05 PROBLEM — D12.0 BENIGN NEOPLASM OF CECUM: Status: ACTIVE | Noted: 2019-09-05

## 2019-09-16 ENCOUNTER — LAB ENCOUNTER (OUTPATIENT)
Dept: LAB | Age: 64
End: 2019-09-16
Attending: INTERNAL MEDICINE
Payer: COMMERCIAL

## 2019-09-16 DIAGNOSIS — Z00.00 LABORATORY EXAM ORDERED AS PART OF ROUTINE GENERAL MEDICAL EXAMINATION: ICD-10-CM

## 2019-09-16 DIAGNOSIS — N02.8 IGA NEPHROPATHY: ICD-10-CM

## 2019-09-16 DIAGNOSIS — I10 ESSENTIAL HYPERTENSION: ICD-10-CM

## 2019-09-16 DIAGNOSIS — R73.9 HYPERGLYCEMIA: ICD-10-CM

## 2019-09-16 DIAGNOSIS — Z13.29 SCREENING FOR THYROID DISORDER: ICD-10-CM

## 2019-09-16 LAB
ALBUMIN SERPL-MCNC: 3.9 G/DL (ref 3.4–5)
ALBUMIN/GLOB SERPL: 1 {RATIO} (ref 1–2)
ALP LIVER SERPL-CCNC: 126 U/L (ref 50–130)
ALT SERPL-CCNC: 50 U/L (ref 13–56)
ANION GAP SERPL CALC-SCNC: 3 MMOL/L (ref 0–18)
AST SERPL-CCNC: 32 U/L (ref 15–37)
BASOPHILS # BLD AUTO: 0.04 X10(3) UL (ref 0–0.2)
BASOPHILS NFR BLD AUTO: 0.6 %
BILIRUB SERPL-MCNC: 0.5 MG/DL (ref 0.1–2)
BILIRUB UR QL STRIP.AUTO: NEGATIVE
BUN BLD-MCNC: 19 MG/DL (ref 7–18)
BUN/CREAT SERPL: 17.3 (ref 10–20)
CALCIUM BLD-MCNC: 9.5 MG/DL (ref 8.5–10.1)
CHLORIDE SERPL-SCNC: 105 MMOL/L (ref 98–112)
CHOLEST SMN-MCNC: 211 MG/DL (ref ?–200)
CLARITY UR REFRACT.AUTO: CLEAR
CO2 SERPL-SCNC: 31 MMOL/L (ref 21–32)
CREAT BLD-MCNC: 1.1 MG/DL (ref 0.55–1.02)
CREAT UR-SCNC: 30.2 MG/DL
DEPRECATED RDW RBC AUTO: 44.1 FL (ref 35.1–46.3)
EOSINOPHIL # BLD AUTO: 0.11 X10(3) UL (ref 0–0.7)
EOSINOPHIL NFR BLD AUTO: 1.6 %
ERYTHROCYTE [DISTWIDTH] IN BLOOD BY AUTOMATED COUNT: 12.8 % (ref 11–15)
EST. AVERAGE GLUCOSE BLD GHB EST-MCNC: 123 MG/DL (ref 68–126)
GLOBULIN PLAS-MCNC: 3.8 G/DL (ref 2.8–4.4)
GLUCOSE BLD-MCNC: 123 MG/DL (ref 70–99)
GLUCOSE UR STRIP.AUTO-MCNC: NEGATIVE MG/DL
HBA1C MFR BLD HPLC: 5.9 % (ref ?–5.7)
HCT VFR BLD AUTO: 46 % (ref 35–48)
HDLC SERPL-MCNC: 41 MG/DL (ref 40–59)
HGB BLD-MCNC: 14.4 G/DL (ref 12–16)
IMM GRANULOCYTES # BLD AUTO: 0.02 X10(3) UL (ref 0–1)
IMM GRANULOCYTES NFR BLD: 0.3 %
KETONES UR STRIP.AUTO-MCNC: NEGATIVE MG/DL
LDLC SERPL CALC-MCNC: 124 MG/DL (ref ?–100)
LEUKOCYTE ESTERASE UR QL STRIP.AUTO: NEGATIVE
LYMPHOCYTES # BLD AUTO: 2.03 X10(3) UL (ref 1–4)
LYMPHOCYTES NFR BLD AUTO: 29.7 %
M PROTEIN MFR SERPL ELPH: 7.7 G/DL (ref 6.4–8.2)
MCH RBC QN AUTO: 29.5 PG (ref 26–34)
MCHC RBC AUTO-ENTMCNC: 31.3 G/DL (ref 31–37)
MCV RBC AUTO: 94.3 FL (ref 80–100)
MICROALBUMIN UR-MCNC: 0.67 MG/DL
MICROALBUMIN/CREAT 24H UR-RTO: 22.2 UG/MG (ref ?–30)
MONOCYTES # BLD AUTO: 0.35 X10(3) UL (ref 0.1–1)
MONOCYTES NFR BLD AUTO: 5.1 %
NEUTROPHILS # BLD AUTO: 4.28 X10 (3) UL (ref 1.5–7.7)
NEUTROPHILS # BLD AUTO: 4.28 X10(3) UL (ref 1.5–7.7)
NEUTROPHILS NFR BLD AUTO: 62.7 %
NITRITE UR QL STRIP.AUTO: NEGATIVE
NONHDLC SERPL-MCNC: 170 MG/DL (ref ?–130)
OSMOLALITY SERPL CALC.SUM OF ELEC: 292 MOSM/KG (ref 275–295)
PH UR STRIP.AUTO: 6 [PH] (ref 4.5–8)
PLATELET # BLD AUTO: 346 10(3)UL (ref 150–450)
POTASSIUM SERPL-SCNC: 4.2 MMOL/L (ref 3.5–5.1)
PROT UR STRIP.AUTO-MCNC: NEGATIVE MG/DL
RBC # BLD AUTO: 4.88 X10(6)UL (ref 3.8–5.3)
RBC UR QL AUTO: NEGATIVE
SODIUM SERPL-SCNC: 139 MMOL/L (ref 136–145)
SP GR UR STRIP.AUTO: <1.005 (ref 1–1.03)
TRIGL SERPL-MCNC: 228 MG/DL (ref 30–149)
UROBILINOGEN UR STRIP.AUTO-MCNC: <2 MG/DL
VLDLC SERPL CALC-MCNC: 46 MG/DL (ref 0–30)
WBC # BLD AUTO: 6.8 X10(3) UL (ref 4–11)

## 2019-09-16 PROCEDURE — 84439 ASSAY OF FREE THYROXINE: CPT | Performed by: FAMILY MEDICINE

## 2019-09-16 PROCEDURE — 80061 LIPID PANEL: CPT | Performed by: FAMILY MEDICINE

## 2019-09-16 PROCEDURE — 80050 GENERAL HEALTH PANEL: CPT | Performed by: FAMILY MEDICINE

## 2019-09-16 PROCEDURE — 82570 ASSAY OF URINE CREATININE: CPT | Performed by: INTERNAL MEDICINE

## 2019-09-16 PROCEDURE — 36415 COLL VENOUS BLD VENIPUNCTURE: CPT | Performed by: FAMILY MEDICINE

## 2019-09-16 PROCEDURE — 82043 UR ALBUMIN QUANTITATIVE: CPT | Performed by: INTERNAL MEDICINE

## 2019-09-16 PROCEDURE — 83036 HEMOGLOBIN GLYCOSYLATED A1C: CPT | Performed by: FAMILY MEDICINE

## 2019-09-18 ENCOUNTER — PATIENT MESSAGE (OUTPATIENT)
Dept: FAMILY MEDICINE CLINIC | Facility: CLINIC | Age: 64
End: 2019-09-18

## 2019-09-18 DIAGNOSIS — Z13.21 ENCOUNTER FOR VITAMIN DEFICIENCY SCREENING: ICD-10-CM

## 2019-09-18 DIAGNOSIS — Z13.29 SCREENING FOR THYROID DISORDER: Primary | ICD-10-CM

## 2019-09-18 LAB
T4 FREE SERPL-MCNC: 0.9 NG/DL (ref 0.8–1.7)
TSI SER-ACNC: 2.18 MIU/ML (ref 0.36–3.74)

## 2019-09-18 NOTE — TELEPHONE ENCOUNTER
From: Naz Hong  To: Mary Douglas  Sent: 9/18/2019 12:03 PM CDT  Subject: Visit Follow-up Question    Quinn Noland    Since Yin Manriquez met my deductible this year (thanks to surgery), are there any other tests you’d like to run THIS year that would be

## 2019-09-24 ENCOUNTER — HOSPITAL ENCOUNTER (OUTPATIENT)
Dept: MAMMOGRAPHY | Age: 64
Discharge: HOME | End: 2019-09-24
Attending: FAMILY MEDICINE
Payer: COMMERCIAL

## 2019-09-24 DIAGNOSIS — Z12.31 ENCOUNTER FOR SCREENING MAMMOGRAM FOR BREAST CANCER: ICD-10-CM

## 2019-09-24 PROCEDURE — 77063 BREAST TOMOSYNTHESIS BI: CPT | Performed by: FAMILY MEDICINE

## 2019-09-24 PROCEDURE — 77067 SCR MAMMO BI INCL CAD: CPT | Performed by: FAMILY MEDICINE

## 2019-09-30 ENCOUNTER — OFFICE VISIT (OUTPATIENT)
Dept: NEPHROLOGY | Facility: CLINIC | Age: 64
End: 2019-09-30
Payer: COMMERCIAL

## 2019-09-30 VITALS — BODY MASS INDEX: 27 KG/M2 | SYSTOLIC BLOOD PRESSURE: 134 MMHG | WEIGHT: 152 LBS | DIASTOLIC BLOOD PRESSURE: 78 MMHG

## 2019-09-30 DIAGNOSIS — I10 ESSENTIAL HYPERTENSION: ICD-10-CM

## 2019-09-30 DIAGNOSIS — N02.8 IGA NEPHROPATHY: Primary | ICD-10-CM

## 2019-09-30 PROCEDURE — 99243 OFF/OP CNSLTJ NEW/EST LOW 30: CPT | Performed by: INTERNAL MEDICINE

## 2019-09-30 RX ORDER — LISINOPRIL 20 MG/1
20 TABLET ORAL 2 TIMES DAILY
Qty: 180 TABLET | Refills: 3 | Status: SHIPPED | OUTPATIENT
Start: 2019-09-30 | End: 2020-10-19

## 2019-09-30 NOTE — PROGRESS NOTES
Consult Note      REASON FOR CONSULT:  IgA nephropathy/HTN         HPI:   William Aguero is a 60year old female with Patient presents with:  Hypertension  Other:  IgA    Rissa Tan MD    Mrs. Francisco Ramirez was seen in the nephrology clinic today in consult • HYSTEROSCOPY DIAGNOSTIC N/A 12/30/2013    Performed by Teresa Dozier MD at [address] [place] [address]   • OTHER SURGICAL HISTORY  9/2004    renal surgery   • TUBAL LIGATION  1998    Done @[place]          Medications (Active prior to today's visit):    Current Ou Readings from Last 6 Encounters:  09/30/19 : 152 lb  09/04/19 : 149 lb  06/04/19 : 149 lb  01/11/19 : 149 lb  12/17/18 : 149 lb  07/20/18 : 146 lb 12.8 oz    General: Alert and oriented in no apparent distress.   HEENT: No scleral icterus, MMM  Neck: Supple 09/16/2019     Lab Results   Component Value Date    MALBP 0.67 09/16/2019    CREUR 30.20 09/16/2019     Lab Results   Component Value Date    COLORUR Straw 09/16/2019    CLARITY Clear 09/16/2019    SPECGRAVITY <1.005 09/16/2019    GLUUR Negative 09/16/201

## 2020-08-31 ENCOUNTER — TELEPHONE (OUTPATIENT)
Dept: FAMILY MEDICINE CLINIC | Facility: CLINIC | Age: 65
End: 2020-08-31

## 2020-08-31 DIAGNOSIS — Z13.29 SCREENING FOR THYROID DISORDER: Primary | ICD-10-CM

## 2020-08-31 DIAGNOSIS — Z13.228 SCREENING FOR ENDOCRINE, METABOLIC AND IMMUNITY DISORDER: ICD-10-CM

## 2020-08-31 DIAGNOSIS — Z13.0 SCREENING, ANEMIA, DEFICIENCY, IRON: ICD-10-CM

## 2020-08-31 DIAGNOSIS — Z13.220 SCREENING FOR LIPOID DISORDERS: ICD-10-CM

## 2020-08-31 DIAGNOSIS — Z13.29 SCREENING FOR ENDOCRINE, METABOLIC AND IMMUNITY DISORDER: ICD-10-CM

## 2020-08-31 DIAGNOSIS — Z13.0 SCREENING FOR ENDOCRINE, METABOLIC AND IMMUNITY DISORDER: ICD-10-CM

## 2020-08-31 NOTE — TELEPHONE ENCOUNTER
Please enter lab orders for the patient's upcoming physical appointment. Physical scheduled:    Your appointments     Date & Time Appointment Department [facility])    Oct 26, 2020  8:00 AM CDT Adult Physical with Dewey Torres

## 2020-09-12 ENCOUNTER — HOSPITAL ENCOUNTER (OUTPATIENT)
Dept: MAMMOGRAPHY | Age: 65
Discharge: HOME | End: 2020-09-12
Attending: FAMILY MEDICINE
Payer: COMMERCIAL

## 2020-09-12 DIAGNOSIS — Z12.31 ENCOUNTER FOR SCREENING MAMMOGRAM FOR MALIGNANT NEOPLASM OF BREAST: ICD-10-CM

## 2020-09-12 PROCEDURE — 77063 BREAST TOMOSYNTHESIS BI: CPT | Performed by: FAMILY MEDICINE

## 2020-09-12 PROCEDURE — 77067 SCR MAMMO BI INCL CAD: CPT | Performed by: FAMILY MEDICINE

## 2020-10-04 ENCOUNTER — PATIENT MESSAGE (OUTPATIENT)
Dept: FAMILY MEDICINE CLINIC | Facility: CLINIC | Age: 65
End: 2020-10-04

## 2020-10-04 DIAGNOSIS — M25.562 LEFT KNEE PAIN, UNSPECIFIED CHRONICITY: Primary | ICD-10-CM

## 2020-10-05 NOTE — TELEPHONE ENCOUNTER
From: Rios Shoemaker  To: Sanchez Haddad PA-C  Sent: 10/4/2020 2:33 PM CDT  Subject: Non-Urgent Medical Question    Can you order a MRI of my left knee. I’m experiencing pain like I did a few years ago. Ended up having meniscus surgery.  Would like to lokesh

## 2020-10-08 ENCOUNTER — TELEPHONE (OUTPATIENT)
Dept: FAMILY MEDICINE CLINIC | Facility: CLINIC | Age: 65
End: 2020-10-08

## 2020-10-08 NOTE — TELEPHONE ENCOUNTER
Received incoming test results from [facility].  MRI results placed in Elizabeth Torres's inbox for review

## 2020-10-08 NOTE — TELEPHONE ENCOUNTER
There is a medial meniscal tear. Copy of results brought to triage. Please notify pt to see ortho. Thanks.

## 2020-10-13 ENCOUNTER — TELEPHONE (OUTPATIENT)
Dept: FAMILY MEDICINE CLINIC | Facility: CLINIC | Age: 65
End: 2020-10-13

## 2020-10-13 DIAGNOSIS — Z01.812 ENCOUNTER FOR PRE-OPERATIVE LABORATORY TESTING: Primary | ICD-10-CM

## 2020-10-13 NOTE — TELEPHONE ENCOUNTER
Received fax from [facility], [facility]. office with PRE OP orders, paperwork in triage.

## 2020-10-13 NOTE — TELEPHONE ENCOUNTER
Patient is calling she is scheduled for surgery on 10/23/20 for meniscal tear on left knee, with Dr. Jocy Pacheco. She needs pre op this week because her Covid test is Tuesday and she has to quarantine after.  Labs are entered for her physical with Jaxon on 10/2

## 2020-10-13 NOTE — TELEPHONE ENCOUNTER
Left message on answering machine to call triage. Pre-op tests pended- need to schedule pre-op appt> Once appt scheduled, we can add provider to pended lab orders. EKG will be done at appt.

## 2020-10-13 NOTE — TELEPHONE ENCOUNTER
Please enter lab orders for the patient's upcoming physical appointment. Physical scheduled:    Your appointments     Date & Time Appointment Department [facility])    Nov 09, 2020  1:30 PM CST Physical - Established with Earlene Torres PA-C [employer]

## 2020-10-14 ENCOUNTER — LAB ENCOUNTER (OUTPATIENT)
Dept: LAB | Age: 65
End: 2020-10-14
Attending: FAMILY MEDICINE
Payer: COMMERCIAL

## 2020-10-14 ENCOUNTER — HOSPITAL ENCOUNTER (OUTPATIENT)
Dept: GENERAL RADIOLOGY | Age: 65
Discharge: HOME | End: 2020-10-14
Attending: FAMILY MEDICINE
Payer: COMMERCIAL

## 2020-10-14 DIAGNOSIS — Z01.812 ENCOUNTER FOR PRE-OPERATIVE LABORATORY TESTING: ICD-10-CM

## 2020-10-14 PROCEDURE — 36415 COLL VENOUS BLD VENIPUNCTURE: CPT

## 2020-10-14 PROCEDURE — 85025 COMPLETE CBC W/AUTO DIFF WBC: CPT

## 2020-10-14 PROCEDURE — 80053 COMPREHEN METABOLIC PANEL: CPT

## 2020-10-14 PROCEDURE — 71046 X-RAY EXAM CHEST 2 VIEWS: CPT | Performed by: FAMILY MEDICINE

## 2020-10-15 NOTE — TELEPHONE ENCOUNTER
Told Parvin that lab orders have been entered for her physical. When she has them drawn she should request only Thyroid. CMP and CBC have been done recently for a preop. Parvin verbalized understanding.

## 2020-10-19 ENCOUNTER — OFFICE VISIT (OUTPATIENT)
Dept: FAMILY MEDICINE CLINIC | Facility: CLINIC | Age: 65
End: 2020-10-19
Payer: COMMERCIAL

## 2020-10-19 VITALS
RESPIRATION RATE: 16 BRPM | HEART RATE: 78 BPM | WEIGHT: 142 LBS | DIASTOLIC BLOOD PRESSURE: 70 MMHG | TEMPERATURE: 99 F | HEIGHT: 63 IN | SYSTOLIC BLOOD PRESSURE: 130 MMHG | BODY MASS INDEX: 25.16 KG/M2

## 2020-10-19 DIAGNOSIS — Z01.818 PREOP EXAM FOR INTERNAL MEDICINE: Primary | ICD-10-CM

## 2020-10-19 DIAGNOSIS — N02.8 IGA NEPHROPATHY: ICD-10-CM

## 2020-10-19 DIAGNOSIS — S83.8X2D INJURY OF MENISCUS OF LEFT KNEE, SUBSEQUENT ENCOUNTER: ICD-10-CM

## 2020-10-19 DIAGNOSIS — M25.562 ACUTE PAIN OF LEFT KNEE: ICD-10-CM

## 2020-10-19 PROCEDURE — 99243 OFF/OP CNSLTJ NEW/EST LOW 30: CPT | Performed by: FAMILY MEDICINE

## 2020-10-19 PROCEDURE — 3078F DIAST BP <80 MM HG: CPT | Performed by: FAMILY MEDICINE

## 2020-10-19 PROCEDURE — 3075F SYST BP GE 130 - 139MM HG: CPT | Performed by: FAMILY MEDICINE

## 2020-10-19 PROCEDURE — 3008F BODY MASS INDEX DOCD: CPT | Performed by: FAMILY MEDICINE

## 2020-10-19 RX ORDER — LISINOPRIL 20 MG/1
20 TABLET ORAL 2 TIMES DAILY
Qty: 180 TABLET | Refills: 3 | Status: SHIPPED | OUTPATIENT
Start: 2020-10-19 | End: 2021-10-06

## 2020-10-19 NOTE — PROGRESS NOTES
Payor:  BELLE ISIDRO PPO / Plan: BELLE CHOICE POS / Product Type: POS /     Subjective:  HPI:  63year old female who has a past medical history of HIGH BLOOD PRESSURE, High cholesterol, Hypertension, Lipid screening (11/28/2011), OTHER DISEASES, Pap smear for ce (between 4 and 10 METs).     ROS: Denies fever/chills, sleep problems, HA, vision changes, dizziness, lightheadedness, rhinorrhea, SOB, cough, difficulty swallowing, CP, edema, N/V, abd pain, diarrhea, constipation, hematochezia, dysuria, frequency, urgency facility-administered medications on file prior to visit.      No Known Allergies    OBJECTIVE:     10/19/20  1244   BP: 130/70   Pulse: 78   Resp: 16   Temp: 98.6 °F (37 °C)   TempSrc: Temporal   Weight: 142 lb (64.4 kg)   Height: 63\"     Body mass index x10(3) uL 0.36   Eosinophils Absolute      0.00 - 0.70 x10(3) uL 0.16   Basophils Absolute      0.00 - 0.20 x10(3) uL 0.06   Immature Granulocyte Absolute      0.00 - 1.00 x10(3) uL 0.01   Neutrophils %      % 57.9   Lymphocytes %      % 32.5   Monocytes % total) by mouth 2 (two) times daily. HTN: Well controlled BP, would continue current therapy throughout entire surgical process. All pertinent previous records reviewed prior to and during visit.     No orders of the defined types were placed in this

## 2020-11-06 ENCOUNTER — LAB ENCOUNTER (OUTPATIENT)
Dept: LAB | Age: 65
End: 2020-11-06
Attending: FAMILY MEDICINE
Payer: COMMERCIAL

## 2020-11-06 DIAGNOSIS — Z13.0 SCREENING FOR ENDOCRINE, METABOLIC AND IMMUNITY DISORDER: ICD-10-CM

## 2020-11-06 DIAGNOSIS — Z13.0 SCREENING, ANEMIA, DEFICIENCY, IRON: ICD-10-CM

## 2020-11-06 DIAGNOSIS — Z13.29 SCREENING FOR THYROID DISORDER: ICD-10-CM

## 2020-11-06 DIAGNOSIS — Z13.228 SCREENING FOR ENDOCRINE, METABOLIC AND IMMUNITY DISORDER: ICD-10-CM

## 2020-11-06 DIAGNOSIS — Z13.29 SCREENING FOR ENDOCRINE, METABOLIC AND IMMUNITY DISORDER: ICD-10-CM

## 2020-11-06 DIAGNOSIS — Z13.220 SCREENING FOR LIPOID DISORDERS: ICD-10-CM

## 2020-11-06 PROCEDURE — 80061 LIPID PANEL: CPT

## 2020-11-06 PROCEDURE — 84443 ASSAY THYROID STIM HORMONE: CPT

## 2020-11-06 PROCEDURE — 84439 ASSAY OF FREE THYROXINE: CPT

## 2020-11-06 PROCEDURE — 36415 COLL VENOUS BLD VENIPUNCTURE: CPT

## 2020-11-09 ENCOUNTER — OFFICE VISIT (OUTPATIENT)
Dept: FAMILY MEDICINE CLINIC | Facility: CLINIC | Age: 65
End: 2020-11-09
Payer: COMMERCIAL

## 2020-11-09 VITALS
HEART RATE: 88 BPM | HEIGHT: 62.75 IN | SYSTOLIC BLOOD PRESSURE: 118 MMHG | RESPIRATION RATE: 16 BRPM | BODY MASS INDEX: 25.41 KG/M2 | TEMPERATURE: 98 F | DIASTOLIC BLOOD PRESSURE: 74 MMHG | WEIGHT: 141.63 LBS

## 2020-11-09 DIAGNOSIS — Z13.220 SCREENING, LIPID: ICD-10-CM

## 2020-11-09 DIAGNOSIS — Z11.51 SCREENING FOR HUMAN PAPILLOMAVIRUS (HPV): ICD-10-CM

## 2020-11-09 DIAGNOSIS — Z12.4 SCREENING FOR CERVICAL CANCER: ICD-10-CM

## 2020-11-09 DIAGNOSIS — Z00.00 ROUTINE GENERAL MEDICAL EXAMINATION AT A HEALTH CARE FACILITY: Primary | ICD-10-CM

## 2020-11-09 PROCEDURE — 88175 CYTOPATH C/V AUTO FLUID REDO: CPT | Performed by: FAMILY MEDICINE

## 2020-11-09 PROCEDURE — 87624 HPV HI-RISK TYP POOLED RSLT: CPT | Performed by: FAMILY MEDICINE

## 2020-11-09 PROCEDURE — 3078F DIAST BP <80 MM HG: CPT | Performed by: FAMILY MEDICINE

## 2020-11-09 PROCEDURE — 99396 PREV VISIT EST AGE 40-64: CPT | Performed by: FAMILY MEDICINE

## 2020-11-09 PROCEDURE — 3074F SYST BP LT 130 MM HG: CPT | Performed by: FAMILY MEDICINE

## 2020-11-09 PROCEDURE — 3008F BODY MASS INDEX DOCD: CPT | Performed by: FAMILY MEDICINE

## 2020-11-09 PROCEDURE — 99072 ADDL SUPL MATRL&STAF TM PHE: CPT | Performed by: FAMILY MEDICINE

## 2020-11-09 NOTE — PROGRESS NOTES
:HPI:   Kate Mena is a 63year old female who presents for a complete physical exam.     Wt Readings from Last 3 Encounters:  11/09/20 : 141 lb 9.6 oz (64.2 kg)  10/19/20 : 142 lb (64.4 kg)  09/30/19 : 152 lb (68.9 kg)    Body mass index is 25.28 kg/ month      Drinks per session: 1 or 2      Binge frequency: Never      Comment: 2 drinks a week    Drug use: No    Occ: homemaker. : . Children: grown son and daughter. Daughter was just  2 weeks ago. Laury Rodriguez Exercise: walking.   Diet: heal intact    CBC  (most recent labs)   Lab Results   Component Value Date/Time    WBC 6.2 10/14/2020 07:57 AM    HGB 14.8 10/14/2020 07:57 AM    HCT 46.5 10/14/2020 07:57 AM    .0 10/14/2020 07:57 AM         CMP  (most recent labs)   Lab Results   Comp The patient indicates understanding of these issues and agrees to the plan. The patient is asked to return for CPX annually and sooner if needed.

## 2021-06-10 ENCOUNTER — APPOINTMENT (OUTPATIENT)
Dept: GENERAL RADIOLOGY | Facility: HOSPITAL | Age: 66
End: 2021-06-10
Attending: EMERGENCY MEDICINE
Payer: COMMERCIAL

## 2021-06-10 ENCOUNTER — HOSPITAL ENCOUNTER (OUTPATIENT)
Facility: HOSPITAL | Age: 66
Setting detail: OBSERVATION
Discharge: HOME | End: 2021-06-11
Attending: EMERGENCY MEDICINE | Admitting: INTERNAL MEDICINE
Payer: COMMERCIAL

## 2021-06-10 DIAGNOSIS — S42.402A CLOSED FRACTURE DISLOCATION OF LEFT ELBOW, INITIAL ENCOUNTER: Primary | ICD-10-CM

## 2021-06-10 PROCEDURE — 73100 X-RAY EXAM OF WRIST: CPT | Performed by: EMERGENCY MEDICINE

## 2021-06-10 PROCEDURE — 99220 INITIAL OBSERVATION CARE,LEVL III: CPT | Performed by: INTERNAL MEDICINE

## 2021-06-10 PROCEDURE — 73080 X-RAY EXAM OF ELBOW: CPT | Performed by: EMERGENCY MEDICINE

## 2021-06-10 PROCEDURE — 73610 X-RAY EXAM OF ANKLE: CPT | Performed by: EMERGENCY MEDICINE

## 2021-06-10 RX ORDER — MORPHINE SULFATE 4 MG/ML
4 INJECTION, SOLUTION INTRAMUSCULAR; INTRAVENOUS ONCE
Status: COMPLETED | OUTPATIENT
Start: 2021-06-10 | End: 2021-06-10

## 2021-06-10 RX ORDER — HYDROCODONE BITARTRATE AND ACETAMINOPHEN 5; 325 MG/1; MG/1
1 TABLET ORAL EVERY 4 HOURS PRN
Status: DISCONTINUED | OUTPATIENT
Start: 2021-06-10 | End: 2021-06-11

## 2021-06-10 RX ORDER — SODIUM PHOSPHATE, DIBASIC AND SODIUM PHOSPHATE, MONOBASIC 7; 19 G/133ML; G/133ML
1 ENEMA RECTAL ONCE AS NEEDED
Status: DISCONTINUED | OUTPATIENT
Start: 2021-06-10 | End: 2021-06-11

## 2021-06-10 RX ORDER — BISACODYL 10 MG
10 SUPPOSITORY, RECTAL RECTAL
Status: DISCONTINUED | OUTPATIENT
Start: 2021-06-10 | End: 2021-06-11

## 2021-06-10 RX ORDER — HYDROCODONE BITARTRATE AND ACETAMINOPHEN 5; 325 MG/1; MG/1
1 TABLET ORAL ONCE
Status: COMPLETED | OUTPATIENT
Start: 2021-06-10 | End: 2021-06-10

## 2021-06-10 RX ORDER — MORPHINE SULFATE 4 MG/ML
4 INJECTION, SOLUTION INTRAMUSCULAR; INTRAVENOUS EVERY 30 MIN PRN
Status: DISCONTINUED | OUTPATIENT
Start: 2021-06-10 | End: 2021-06-10

## 2021-06-10 RX ORDER — ONDANSETRON 2 MG/ML
4 INJECTION INTRAMUSCULAR; INTRAVENOUS EVERY 6 HOURS PRN
Status: DISCONTINUED | OUTPATIENT
Start: 2021-06-10 | End: 2021-06-11

## 2021-06-10 RX ORDER — ONDANSETRON 2 MG/ML
4 INJECTION INTRAMUSCULAR; INTRAVENOUS EVERY 4 HOURS PRN
Status: DISCONTINUED | OUTPATIENT
Start: 2021-06-10 | End: 2021-06-10

## 2021-06-10 RX ORDER — LISINOPRIL 20 MG/1
20 TABLET ORAL 2 TIMES DAILY
Status: DISCONTINUED | OUTPATIENT
Start: 2021-06-11 | End: 2021-06-11

## 2021-06-10 RX ORDER — DOCUSATE SODIUM 100 MG/1
100 CAPSULE, LIQUID FILLED ORAL 2 TIMES DAILY
Status: DISCONTINUED | OUTPATIENT
Start: 2021-06-10 | End: 2021-06-11

## 2021-06-10 RX ORDER — PROCHLORPERAZINE EDISYLATE 5 MG/ML
5 INJECTION INTRAMUSCULAR; INTRAVENOUS EVERY 8 HOURS PRN
Status: DISCONTINUED | OUTPATIENT
Start: 2021-06-10 | End: 2021-06-11

## 2021-06-10 RX ORDER — HYDROMORPHONE HYDROCHLORIDE 1 MG/ML
0.4 INJECTION, SOLUTION INTRAMUSCULAR; INTRAVENOUS; SUBCUTANEOUS EVERY 2 HOUR PRN
Status: DISCONTINUED | OUTPATIENT
Start: 2021-06-10 | End: 2021-06-11

## 2021-06-10 RX ORDER — HYDROMORPHONE HYDROCHLORIDE 1 MG/ML
0.8 INJECTION, SOLUTION INTRAMUSCULAR; INTRAVENOUS; SUBCUTANEOUS EVERY 2 HOUR PRN
Status: DISCONTINUED | OUTPATIENT
Start: 2021-06-10 | End: 2021-06-11

## 2021-06-10 RX ORDER — SODIUM CHLORIDE 9 MG/ML
INJECTION, SOLUTION INTRAVENOUS CONTINUOUS
Status: DISCONTINUED | OUTPATIENT
Start: 2021-06-10 | End: 2021-06-11

## 2021-06-10 RX ORDER — POLYETHYLENE GLYCOL 3350 17 G/17G
17 POWDER, FOR SOLUTION ORAL DAILY PRN
Status: DISCONTINUED | OUTPATIENT
Start: 2021-06-10 | End: 2021-06-11

## 2021-06-10 RX ORDER — HYDROMORPHONE HYDROCHLORIDE 1 MG/ML
0.2 INJECTION, SOLUTION INTRAMUSCULAR; INTRAVENOUS; SUBCUTANEOUS EVERY 2 HOUR PRN
Status: DISCONTINUED | OUTPATIENT
Start: 2021-06-10 | End: 2021-06-11

## 2021-06-10 RX ORDER — HYDROCODONE BITARTRATE AND ACETAMINOPHEN 5; 325 MG/1; MG/1
2 TABLET ORAL EVERY 4 HOURS PRN
Status: DISCONTINUED | OUTPATIENT
Start: 2021-06-10 | End: 2021-06-11

## 2021-06-10 RX ORDER — ACETAMINOPHEN 325 MG/1
650 TABLET ORAL EVERY 4 HOURS PRN
Status: DISCONTINUED | OUTPATIENT
Start: 2021-06-10 | End: 2021-06-11

## 2021-06-10 NOTE — H&P
[facility]  History and Physical     jane Britton Patient Status:  Emergency    1955 MRN [id number]   Location [address] [facility] Attending Adamaris Mcmahan MD   Hosp Day # 0 PCP Tae Maradiaga MD     Chief Complaint: drugs.     Family History:   Family History   Problem Relation Age of Onset   • Heart Attack Maternal Grandfather         AMI   • Heart Attack Paternal Grandfather         AMI   • Cancer Mother         bladder   • Dementia Paternal Grandmother    • Hyperten TP in the last 168 hours. CrCl cannot be calculated (Patient's most recent lab result is older than the maximum 7 days allowed. ). No results for input(s): PTP, INR in the last 168 hours.     COVID-19 Lab Results    COVID-19  No results found for: COVI

## 2021-06-10 NOTE — ED PROVIDER NOTES
Patient Seen in: [facility] Emergency Department      History   Patient presents with:  Leg or Foot Injury  Arm or Hand Injury  Fall    Stated Complaint: twisted right ankle, fell. pain to right ankle and left elbow    HPI/Subjective:   HPI    Patie Temporal   SpO2 97 %   O2 Device None (Room air)       Current:/85   Pulse 71   Temp 98.2 °F (36.8 °C) (Temporal)   Resp 14   Ht 160 cm (5' 3\")   Wt 64.4 kg   LMP 12/23/2008   SpO2 100%   BMI 25.15 kg/m²         Physical Exam  Vitals and nursing not noted lateral aspect of the calcaneus seen only on the AP view. This could be an avulsion fracture related to the calcaneal attachment of the calcaneal fibular ligament. Correlation with tenderness in this area  is necessary.   This is not confirmed on th 6/10/2021  PROCEDURE:  XR ELBOW, COMPLETE (MIN 3 VIEWS), LEFT (CPT=73080)  TECHNIQUE:  Three views were obtained. COMPARISON:  None.   INDICATIONS:  twisted right ankle, fell. pain to right ankle and left elbow  PATIENT STATED HISTORY: (As transcribed by T afterwards. The patient required sedation for reduction of left elbow fracture dislocation. The risks, benefits, and alternatives were discussed with the patient and/or the family who consented.   The patient had a screening history and exam completed head is still posterior. I think it probably reduced during the reduction and then as it is an unstable pathology came back out again during the process of splinting.   Dr. Jose Kumar reviewed the postreduction x-rays and he does feel she should be admitted and

## 2021-06-11 ENCOUNTER — APPOINTMENT (OUTPATIENT)
Dept: GENERAL RADIOLOGY | Facility: HOSPITAL | Age: 66
End: 2021-06-11
Attending: ORTHOPAEDIC SURGERY
Payer: COMMERCIAL

## 2021-06-11 ENCOUNTER — ANESTHESIA EVENT (OUTPATIENT)
Dept: SURGERY | Facility: HOSPITAL | Age: 66
End: 2021-06-11
Payer: COMMERCIAL

## 2021-06-11 ENCOUNTER — ANESTHESIA (OUTPATIENT)
Dept: SURGERY | Facility: HOSPITAL | Age: 66
End: 2021-06-11
Payer: COMMERCIAL

## 2021-06-11 VITALS
HEART RATE: 84 BPM | BODY MASS INDEX: 25.16 KG/M2 | OXYGEN SATURATION: 93 % | SYSTOLIC BLOOD PRESSURE: 111 MMHG | DIASTOLIC BLOOD PRESSURE: 55 MMHG | HEIGHT: 63 IN | WEIGHT: 142 LBS | TEMPERATURE: 98 F | RESPIRATION RATE: 18 BRPM

## 2021-06-11 PROCEDURE — 76942 ECHO GUIDE FOR BIOPSY: CPT | Performed by: ANESTHESIOLOGY

## 2021-06-11 PROCEDURE — 76000 FLUOROSCOPY <1 HR PHYS/QHP: CPT | Performed by: ORTHOPAEDIC SURGERY

## 2021-06-11 PROCEDURE — BP49ZZZ ULTRASONOGRAPHY OF LEFT SHOULDER: ICD-10-PCS | Performed by: ANESTHESIOLOGY

## 2021-06-11 PROCEDURE — 99243 OFF/OP CNSLTJ NEW/EST LOW 30: CPT | Performed by: ORTHOPAEDIC SURGERY

## 2021-06-11 PROCEDURE — 24635 OPTX MONTEGGIA FX DISLC ELBW: CPT | Performed by: PHYSICIAN ASSISTANT

## 2021-06-11 PROCEDURE — 99217 OBSERVATION CARE DISCHARGE: CPT | Performed by: HOSPITALIST

## 2021-06-11 PROCEDURE — 0PSL04Z REPOSITION LEFT ULNA WITH INTERNAL FIXATION DEVICE, OPEN APPROACH: ICD-10-PCS | Performed by: ORTHOPAEDIC SURGERY

## 2021-06-11 PROCEDURE — 3E0T3BZ INTRODUCTION OF ANESTHETIC AGENT INTO PERIPHERAL NERVES AND PLEXI, PERCUTANEOUS APPROACH: ICD-10-PCS | Performed by: ANESTHESIOLOGY

## 2021-06-11 PROCEDURE — 24635 OPTX MONTEGGIA FX DISLC ELBW: CPT | Performed by: ORTHOPAEDIC SURGERY

## 2021-06-11 DEVICE — OLECRANON PLATE, STANDARD 5-HOLE LT
Type: IMPLANTABLE DEVICE | Site: ELBOW | Status: FUNCTIONAL
Brand: ACUMED

## 2021-06-11 DEVICE — 2.7MM X 14MM NON-LOCKING HEXALOBE SCREW
Type: IMPLANTABLE DEVICE | Site: ELBOW | Status: FUNCTIONAL
Brand: ACUMED

## 2021-06-11 DEVICE — 2.7MM X 14MM LOCKING HEXALOBE SCREW
Type: IMPLANTABLE DEVICE | Site: ELBOW | Status: FUNCTIONAL
Brand: ACUMED

## 2021-06-11 DEVICE — 2.7MM X 12MM LOCKING HEXALOBE SCREW
Type: IMPLANTABLE DEVICE | Site: ELBOW | Status: FUNCTIONAL
Brand: ACUMED

## 2021-06-11 DEVICE — 2.7MM X 18MM NON-LOCKING HEXALOBE SCREW
Type: IMPLANTABLE DEVICE | Site: ELBOW | Status: FUNCTIONAL
Brand: ACUMED

## 2021-06-11 DEVICE — 3.5MM X 14MM NON-LOCKING HEXALOBE SCREW
Type: IMPLANTABLE DEVICE | Site: ELBOW | Status: FUNCTIONAL
Brand: ACUMED

## 2021-06-11 DEVICE — 3.5MM X 12MM NON-LOCKING HEXALOBE SCREW
Type: IMPLANTABLE DEVICE | Site: ELBOW | Status: FUNCTIONAL
Brand: ACUMED

## 2021-06-11 RX ORDER — ACETAMINOPHEN 500 MG
1000 TABLET ORAL ONCE AS NEEDED
Status: DISCONTINUED | OUTPATIENT
Start: 2021-06-11 | End: 2021-06-11 | Stop reason: HOSPADM

## 2021-06-11 RX ORDER — NALOXONE HYDROCHLORIDE 0.4 MG/ML
80 INJECTION, SOLUTION INTRAMUSCULAR; INTRAVENOUS; SUBCUTANEOUS AS NEEDED
Status: DISCONTINUED | OUTPATIENT
Start: 2021-06-11 | End: 2021-06-11 | Stop reason: HOSPADM

## 2021-06-11 RX ORDER — ONDANSETRON 2 MG/ML
4 INJECTION INTRAMUSCULAR; INTRAVENOUS EVERY 4 HOURS PRN
Status: DISCONTINUED | OUTPATIENT
Start: 2021-06-11 | End: 2021-06-11

## 2021-06-11 RX ORDER — MEPERIDINE HYDROCHLORIDE 25 MG/ML
12.5 INJECTION INTRAMUSCULAR; INTRAVENOUS; SUBCUTANEOUS AS NEEDED
Status: DISCONTINUED | OUTPATIENT
Start: 2021-06-11 | End: 2021-06-11 | Stop reason: HOSPADM

## 2021-06-11 RX ORDER — METOCLOPRAMIDE HYDROCHLORIDE 5 MG/ML
INJECTION INTRAMUSCULAR; INTRAVENOUS AS NEEDED
Status: DISCONTINUED | OUTPATIENT
Start: 2021-06-11 | End: 2021-06-11 | Stop reason: SURG

## 2021-06-11 RX ORDER — SODIUM CHLORIDE, SODIUM LACTATE, POTASSIUM CHLORIDE, CALCIUM CHLORIDE 600; 310; 30; 20 MG/100ML; MG/100ML; MG/100ML; MG/100ML
INJECTION, SOLUTION INTRAVENOUS CONTINUOUS
Status: DISCONTINUED | OUTPATIENT
Start: 2021-06-11 | End: 2021-06-11 | Stop reason: HOSPADM

## 2021-06-11 RX ORDER — PHENYLEPHRINE HCL 10 MG/ML
VIAL (ML) INJECTION AS NEEDED
Status: DISCONTINUED | OUTPATIENT
Start: 2021-06-11 | End: 2021-06-11 | Stop reason: SURG

## 2021-06-11 RX ORDER — CEFAZOLIN SODIUM/WATER 2 G/20 ML
2 SYRINGE (ML) INTRAVENOUS ONCE
Status: COMPLETED | OUTPATIENT
Start: 2021-06-11 | End: 2021-06-11

## 2021-06-11 RX ORDER — ONDANSETRON 2 MG/ML
4 INJECTION INTRAMUSCULAR; INTRAVENOUS AS NEEDED
Status: DISCONTINUED | OUTPATIENT
Start: 2021-06-11 | End: 2021-06-11 | Stop reason: HOSPADM

## 2021-06-11 RX ORDER — CEFAZOLIN SODIUM 1 G/3ML
INJECTION, POWDER, FOR SOLUTION INTRAMUSCULAR; INTRAVENOUS AS NEEDED
Status: DISCONTINUED | OUTPATIENT
Start: 2021-06-11 | End: 2021-06-11 | Stop reason: SURG

## 2021-06-11 RX ORDER — SODIUM CHLORIDE, SODIUM LACTATE, POTASSIUM CHLORIDE, CALCIUM CHLORIDE 600; 310; 30; 20 MG/100ML; MG/100ML; MG/100ML; MG/100ML
INJECTION, SOLUTION INTRAVENOUS CONTINUOUS PRN
Status: DISCONTINUED | OUTPATIENT
Start: 2021-06-11 | End: 2021-06-11 | Stop reason: SURG

## 2021-06-11 RX ORDER — MIDAZOLAM HYDROCHLORIDE 1 MG/ML
1 INJECTION INTRAMUSCULAR; INTRAVENOUS EVERY 5 MIN PRN
Status: DISCONTINUED | OUTPATIENT
Start: 2021-06-11 | End: 2021-06-11 | Stop reason: HOSPADM

## 2021-06-11 RX ORDER — HYDROCODONE BITARTRATE AND ACETAMINOPHEN 5; 325 MG/1; MG/1
2 TABLET ORAL AS NEEDED
Status: DISCONTINUED | OUTPATIENT
Start: 2021-06-11 | End: 2021-06-11 | Stop reason: HOSPADM

## 2021-06-11 RX ORDER — HYDROMORPHONE HYDROCHLORIDE 1 MG/ML
0.4 INJECTION, SOLUTION INTRAMUSCULAR; INTRAVENOUS; SUBCUTANEOUS EVERY 5 MIN PRN
Status: DISCONTINUED | OUTPATIENT
Start: 2021-06-11 | End: 2021-06-11 | Stop reason: HOSPADM

## 2021-06-11 RX ORDER — LIDOCAINE HYDROCHLORIDE 10 MG/ML
INJECTION, SOLUTION EPIDURAL; INFILTRATION; INTRACAUDAL; PERINEURAL AS NEEDED
Status: DISCONTINUED | OUTPATIENT
Start: 2021-06-11 | End: 2021-06-11 | Stop reason: SURG

## 2021-06-11 RX ORDER — HYDROCODONE BITARTRATE AND ACETAMINOPHEN 5; 325 MG/1; MG/1
1 TABLET ORAL AS NEEDED
Status: DISCONTINUED | OUTPATIENT
Start: 2021-06-11 | End: 2021-06-11 | Stop reason: HOSPADM

## 2021-06-11 RX ORDER — HYDROCODONE BITARTRATE AND ACETAMINOPHEN 5; 325 MG/1; MG/1
1 TABLET ORAL EVERY 4 HOURS PRN
Qty: 30 TABLET | Refills: 0 | Status: SHIPPED | OUTPATIENT
Start: 2021-06-11 | End: 2021-06-21

## 2021-06-11 RX ORDER — MIDAZOLAM HYDROCHLORIDE 1 MG/ML
INJECTION INTRAMUSCULAR; INTRAVENOUS AS NEEDED
Status: DISCONTINUED | OUTPATIENT
Start: 2021-06-11 | End: 2021-06-11 | Stop reason: SURG

## 2021-06-11 RX ORDER — ONDANSETRON 2 MG/ML
INJECTION INTRAMUSCULAR; INTRAVENOUS AS NEEDED
Status: DISCONTINUED | OUTPATIENT
Start: 2021-06-11 | End: 2021-06-11 | Stop reason: SURG

## 2021-06-11 RX ORDER — DEXAMETHASONE SODIUM PHOSPHATE 4 MG/ML
VIAL (ML) INJECTION AS NEEDED
Status: DISCONTINUED | OUTPATIENT
Start: 2021-06-11 | End: 2021-06-11 | Stop reason: SURG

## 2021-06-11 RX ADMIN — SODIUM CHLORIDE, SODIUM LACTATE, POTASSIUM CHLORIDE, CALCIUM CHLORIDE: 600; 310; 30; 20 INJECTION, SOLUTION INTRAVENOUS at 13:04:00

## 2021-06-11 RX ADMIN — SODIUM CHLORIDE, SODIUM LACTATE, POTASSIUM CHLORIDE, CALCIUM CHLORIDE: 600; 310; 30; 20 INJECTION, SOLUTION INTRAVENOUS at 10:43:00

## 2021-06-11 RX ADMIN — PHENYLEPHRINE HCL 100 MCG: 10 MG/ML VIAL (ML) INJECTION at 11:05:00

## 2021-06-11 RX ADMIN — CEFAZOLIN SODIUM 2 G: 1 INJECTION, POWDER, FOR SOLUTION INTRAMUSCULAR; INTRAVENOUS at 10:59:00

## 2021-06-11 RX ADMIN — PHENYLEPHRINE HCL 100 MCG: 10 MG/ML VIAL (ML) INJECTION at 11:19:00

## 2021-06-11 RX ADMIN — ONDANSETRON 4 MG: 2 INJECTION INTRAMUSCULAR; INTRAVENOUS at 12:45:00

## 2021-06-11 RX ADMIN — DEXAMETHASONE SODIUM PHOSPHATE 8 MG: 4 MG/ML VIAL (ML) INJECTION at 10:57:00

## 2021-06-11 RX ADMIN — MIDAZOLAM HYDROCHLORIDE 2 MG: 1 INJECTION INTRAMUSCULAR; INTRAVENOUS at 10:43:00

## 2021-06-11 RX ADMIN — METOCLOPRAMIDE HYDROCHLORIDE 10 MG: 5 INJECTION INTRAMUSCULAR; INTRAVENOUS at 10:57:00

## 2021-06-11 RX ADMIN — PHENYLEPHRINE HCL 100 MCG: 10 MG/ML VIAL (ML) INJECTION at 11:14:00

## 2021-06-11 RX ADMIN — LIDOCAINE HYDROCHLORIDE 50 MG: 10 INJECTION, SOLUTION EPIDURAL; INFILTRATION; INTRACAUDAL; PERINEURAL at 10:57:00

## 2021-06-11 NOTE — BRIEF OP NOTE
Pre-Operative Diagnosis: Elbow fracture [S42.409A]     Post-Operative Diagnosis: Elbow fracture [S42.409A]      Procedure Performed:   LEFT ELBOW OPEN REDUCTION INTERNAL FIXATION    Surgeon(s) and Role:     Emmanuel Fair MD - Primary    Assistant(s):  PA

## 2021-06-11 NOTE — ANESTHESIA PROCEDURE NOTES
Airway  Urgency: elective    Airway not difficult    General Information and Staff    Patient location during procedure: OR  Anesthesiologist: Sunday MD Mariana  Resident/CRNA: Ismael Clark CRNA  Performed: CRNA     Indications and Patient Condition  In

## 2021-06-11 NOTE — ANESTHESIA PROCEDURE NOTES
Regional Block  Performed by: Leobardo Dewitt CRNA  Authorized by: Eleanor Patel MD       General Information and Staff    Start Time:   Anesthesiologist: Eleanor Patel MD  CRNA:  Leobardo Dewitt CRNA  Performed by:  CRNA  Patient Location:  OR    Block

## 2021-06-11 NOTE — PROGRESS NOTES
Patient left arm with ace wrap dressing and sling on. Dilaudid 0.4 mg IV given for pain relief this morning. Maintained on NPO, scheduled for surgery this morning.

## 2021-06-11 NOTE — ANESTHESIA PREPROCEDURE EVALUATION
PRE-OP EVALUATION    Patient Name: Graham Bean    Admit Diagnosis: Closed fracture dislocation of left elbow, initial encounter [S42.402A]    Pre-op Diagnosis: Elbow fracture [S42409A]    LEFT ELBOW OPEN REDUCTION INTERNAL FIXATION    Anesthesia Proce lisinopril 20 MG Oral Tab, Take 1 tablet (20 mg total) by mouth 2 (two) times daily. , Disp: 180 tablet, Rfl: 3, 6/10/2021 at 0900        Allergies: Patient has no known allergies. Anesthesia Evaluation    Patient summary reviewed.     Anesthetic Compli per week      Comment: 2 drinks a week      Drug use: No     Available pre-op labs reviewed.   Lab Results   Component Value Date    WBC 7.7 06/11/2021    RBC 4.32 06/11/2021    HGB 13.3 06/11/2021    HCT 40.9 06/11/2021    MCV 94.7 06/11/2021    MCH 30.8 0 patient          Options, risks and benefits of anesthesia as outlined in the anesthesia consent were reviewed with the patient. The consent was signed without further questions.          Present on Admission:  **None**

## 2021-06-11 NOTE — ANESTHESIA POSTPROCEDURE EVALUATION
[facility]    Bassam Carnes Patient Status:  Observation   Age/Gender 63year old female MRN [id number]   [facility] SURGERY Attending Lacho Rainey MD   Hosp Day # 0 PCP Fabiola Montaño MD       Anesthesia Post-op Note    LEFT ELBOW

## 2021-06-11 NOTE — PROGRESS NOTES
NURSING ADMISSION NOTE      Patient admitted via wheelchair. Son at bedside spending the night. Oriented to room. Safety precautions initiated. Bed in low position. Call light in reach. Pt alert and orient x4. LUE in sling with ace wrap.  Pt able

## 2021-06-11 NOTE — PROGRESS NOTES
[facility]  Progress Note     Melani Casper Patient Status:  Observation    1955 MRN [id number]   [facility] 3SW-A Attending Archie Jiang MD   Hosp Day # 0 PCP Polina Mojica MD     Chief Complaint: arm pain   S:  Still input(s): CRP, ROSHAN, LDH, DDIMER in the last 168 hours. Imaging: Imaging data reviewed in Epic. Medications:   • lisinopril  20 mg Oral BID   • docusate sodium  100 mg Oral BID     ASSESSMENT / PLAN:   1.  Mechanical Fall resulting in Left Ulnar fracture,

## 2021-06-11 NOTE — CONSULTS
[facility] Consult Note    CC: left elbow fracture/dislocation    HPI: The patient is a 63year old female status post fall yesterday who was admitted through the ED with a fracture dislocation of her left elbow.   We are asked to see her in consultation aft Used    Vaping Use      Vaping Use: Never used    Substance and Sexual Activity      Alcohol use:  Yes        Alcohol/week: 4.0 standard drinks        Types: 2 Glasses of wine, 2 Standard drinks or equivalent per week        Comment: 2 drinks a week       in the ED. I therefore recommend urgent open reduction internal fixation of the olecranon which should afford a anatomic reduction of the radiocapitellar joint.   We discussed risk benefits and alternatives to surgery including but not limited to possible

## 2021-06-11 NOTE — PLAN OF CARE
Patient verbalized that she had adequate pain relief with IV pain medication. Son at the bedside and will accompany her down to surgical area.

## 2021-06-11 NOTE — PROGRESS NOTES
Patient back to room 366 from PACU post left elbow ORIF transported by bed. Received awake, alert and oriented x 4. Left arm with cast and sling on, patient verbalized \" my arm still numb, I don't feel anything\".   Explained to patient that numbness is

## 2021-06-11 NOTE — PROGRESS NOTES
NURSING DISCHARGE NOTE    Discharged Home via Wheelchair. Accompanied by Family member  Belongings Taken by patient/family. Discharge instructions discussed with patient and son, both verbalized understanding.

## 2021-06-12 NOTE — OPERATIVE REPORT
[facility]    PATIENT'S NAME: Nicola Perdue   ATTENDING PHYSICIAN: Melonie Garcia M.D. OPERATING PHYSICIAN: Marlo Almaraz M.D.    PATIENT ACCOUNT#:   [de-identified]    LOCATION:  [address] [place] [address]  MEDICAL RECORD #:   [id number]       DATE OF BIRTH:  [birth year] limited to, possible infection, bleeding, neurovascular injury, as well as postop stiffness, continued pain, and failed improvement following surgery. The risk of delayed, nonunion or malunion was also discussed.   Hardware irritation with possible need fo distal shaft. There was a butterfly medial wall blowout which was more anterior, and a lateral wall blowout which was more posterior. Great care was taken to ensure continued soft tissue attachments to these fragments were not disrupted.   After sharp dis was easily achieved passively. No instability was noted on posterior lateral drawer. We therefore performed layered closure after copiously irrigating.   This included 2-0 Vicryl for the fascia overlying the plate and ulna followed by 2-0 Vicryl for the s

## 2021-06-12 NOTE — DISCHARGE SUMMARY
[facility]  DISCHARGE SUMMARY     Nikos Love Patient Status:  Observation    1955 MRN [id number]   [facility] 3SW-A Attending No att. providers found   Hosp Day # 0 PCP Manny Beck MD     Date of Admission: 6/10/2021 primary care physician as well as orthopedic surgery.     Procedures during hospitalization:   • ORIF as above    Incidental or significant findings and recommendations (brief descriptions):  • Per Brief Synopsis of Hospital Course    Lab/Test results pendi

## 2021-06-14 ENCOUNTER — TELEPHONE (OUTPATIENT)
Dept: ORTHOPEDICS CLINIC | Facility: CLINIC | Age: 66
End: 2021-06-14

## 2021-06-14 ENCOUNTER — PATIENT OUTREACH (OUTPATIENT)
Dept: CASE MANAGEMENT | Age: 66
End: 2021-06-14

## 2021-06-14 DIAGNOSIS — Z48.89 OTHER SPECIFIED AFTERCARE FOLLOWING SURGERY: ICD-10-CM

## 2021-06-14 DIAGNOSIS — S52.022A CLOSED OLECRANON FRACTURE, LEFT, INITIAL ENCOUNTER: Primary | ICD-10-CM

## 2021-06-14 NOTE — TELEPHONE ENCOUNTER
Future Appointments   Date Time Provider Annette Gabby   6/21/2021 10:45 AM BK XR RM1 BK XRAY [address]   6/21/2021  1:40 PM Vishnu Hernandez MD [facility] [place]

## 2021-06-14 NOTE — TELEPHONE ENCOUNTER
Patient had surgery with Dr Mignon Ralph 6/11/21 for a left elbow dislocation. Please order xray for PO appointment if needed.     Future Appointments   Date Time Provider Annette Gabby   6/21/2021  1:40 PM Leonarda Birmingham MD [facility] [place]

## 2021-06-21 ENCOUNTER — TELEPHONE (OUTPATIENT)
Dept: FAMILY MEDICINE CLINIC | Facility: CLINIC | Age: 66
End: 2021-06-21

## 2021-06-21 ENCOUNTER — OFFICE VISIT (OUTPATIENT)
Dept: ORTHOPEDICS CLINIC | Facility: CLINIC | Age: 66
End: 2021-06-21
Payer: COMMERCIAL

## 2021-06-21 ENCOUNTER — TELEPHONE (OUTPATIENT)
Dept: PHYSICAL THERAPY | Facility: HOSPITAL | Age: 66
End: 2021-06-21

## 2021-06-21 ENCOUNTER — HOSPITAL ENCOUNTER (OUTPATIENT)
Dept: GENERAL RADIOLOGY | Age: 66
Discharge: HOME | End: 2021-06-21
Attending: PHYSICIAN ASSISTANT
Payer: COMMERCIAL

## 2021-06-21 DIAGNOSIS — Z48.89 AFTERCARE FOLLOWING SURGERY: Primary | ICD-10-CM

## 2021-06-21 DIAGNOSIS — S52.092D OTHER CLOSED FRACTURE OF PROXIMAL END OF LEFT ULNA WITH ROUTINE HEALING, SUBSEQUENT ENCOUNTER: ICD-10-CM

## 2021-06-21 DIAGNOSIS — S52.022A CLOSED OLECRANON FRACTURE, LEFT, INITIAL ENCOUNTER: ICD-10-CM

## 2021-06-21 DIAGNOSIS — Z48.89 OTHER SPECIFIED AFTERCARE FOLLOWING SURGERY: ICD-10-CM

## 2021-06-21 PROCEDURE — 99024 POSTOP FOLLOW-UP VISIT: CPT | Performed by: PHYSICIAN ASSISTANT

## 2021-06-21 PROCEDURE — 73080 X-RAY EXAM OF ELBOW: CPT | Performed by: PHYSICIAN ASSISTANT

## 2021-06-21 NOTE — TELEPHONE ENCOUNTER
Pt requesting a post op visit with a . preferably Dr Juan Alberto Chung. Had L elbow  (Ulna) surgery on 6/11/21. Offered an appt with Gean Simmonds but declined.

## 2021-06-21 NOTE — TELEPHONE ENCOUNTER
Future Appointments   Date Time Provider [facility]by   6/25/2021  1:00 PM Cassandra Clemens MD [facility] [place]   7/7/2021 10:45 AM BK XR RM1 BK XRAY [facility]   7/8/2021 10:00 AM Vishnu Hernandez MD [facility] [place]

## 2021-06-21 NOTE — PROGRESS NOTES
[facility] Post Op Progress Note    Date of Surgery: 06/11/2021      Subjective: Marlon Ruano is a 63year old female who is here approximately 10 days status post open reduction internal fixation proximal ulna fracture.   She has tolerated the splint and

## 2021-06-22 ENCOUNTER — TELEPHONE (OUTPATIENT)
Dept: PHYSICAL THERAPY | Facility: HOSPITAL | Age: 66
End: 2021-06-22

## 2021-06-23 ENCOUNTER — OFFICE VISIT (OUTPATIENT)
Dept: PHYSICAL THERAPY | Age: 66
End: 2021-06-23
Attending: PHYSICIAN ASSISTANT
Payer: COMMERCIAL

## 2021-06-23 DIAGNOSIS — Z48.89 AFTERCARE FOLLOWING SURGERY: ICD-10-CM

## 2021-06-23 DIAGNOSIS — S52.092D OTHER CLOSED FRACTURE OF PROXIMAL END OF LEFT ULNA WITH ROUTINE HEALING, SUBSEQUENT ENCOUNTER: ICD-10-CM

## 2021-06-23 PROCEDURE — 97110 THERAPEUTIC EXERCISES: CPT

## 2021-06-23 PROCEDURE — 97162 PT EVAL MOD COMPLEX 30 MIN: CPT

## 2021-06-23 NOTE — PROGRESS NOTES
ELBOW AND HAND EVALUATION:   Referring Physician: Dr. Mukund Daniels  Diagnosis: Aftercare following surgery (Z48.89)  S/p ORIF 6/11/21  Other closed fracture of proximal end of left ulna with routine healing, subsequent encounter (W21.059S)       Date of S discussed evaluation findings, pathology, POC and HEP. Pt voiced understanding and performs HEP correctly without reported pain. Skilled Physical Therapy is medically necessary to address the above impairments and reach functional goals.      Precautions: evaluation involved Moderate Complexity decision making due to 1-2 personal factors/comorbidities, 4+ body structures involved/activity limitations, and evolving symptoms including changing pain levels.   PLAN OF CARE:    Goals: (to be met in 12 visits)   ·

## 2021-06-24 ENCOUNTER — OFFICE VISIT (OUTPATIENT)
Dept: PHYSICAL THERAPY | Age: 66
End: 2021-06-24
Attending: PHYSICIAN ASSISTANT
Payer: COMMERCIAL

## 2021-06-24 PROCEDURE — 97110 THERAPEUTIC EXERCISES: CPT

## 2021-06-24 NOTE — PROGRESS NOTES
Dx: Aftercare following surgery (Z48.89)  S/p ORIF 6/11/21  Other closed fracture of proximal end of left ulna with routine healing, subsequent encounter (Y72.605M         Insurance (Authorized # of Visits):  PPO 12 visits           Authorizing Physician:

## 2021-06-29 ENCOUNTER — OFFICE VISIT (OUTPATIENT)
Dept: PHYSICAL THERAPY | Age: 66
End: 2021-06-29
Attending: PHYSICIAN ASSISTANT
Payer: COMMERCIAL

## 2021-06-29 PROCEDURE — 97110 THERAPEUTIC EXERCISES: CPT

## 2021-06-29 NOTE — PROGRESS NOTES
Dx: Aftercare following surgery (Z48.89)  S/p ORIF 6/11/21  Other closed fracture of proximal end of left ulna with routine healing, subsequent encounter (V82.344I         Insurance (Authorized # of Visits):  PPO 12 visits           Authorizing Physician:

## 2021-07-01 ENCOUNTER — APPOINTMENT (OUTPATIENT)
Dept: PHYSICAL THERAPY | Age: 66
End: 2021-07-01
Attending: PHYSICIAN ASSISTANT
Payer: COMMERCIAL

## 2021-07-01 ENCOUNTER — OFFICE VISIT (OUTPATIENT)
Dept: PHYSICAL THERAPY | Age: 66
End: 2021-07-01
Attending: PHYSICIAN ASSISTANT
Payer: COMMERCIAL

## 2021-07-01 PROCEDURE — 97110 THERAPEUTIC EXERCISES: CPT

## 2021-07-01 NOTE — PROGRESS NOTES
Dx: Aftercare following surgery (Z48.89)  S/p ORIF 6/11/21  Other closed fracture of proximal end of left ulna with routine healing, subsequent encounter (G72.644W         Insurance (Authorized # of Visits):  PPO 12 visits           Authorizing Physician: elbow extension stretch--3 -5 mins    Charges: Ex 3       Total Timed Treatment: 40 min  Total Treatment Time: 50 min

## 2021-07-06 ENCOUNTER — OFFICE VISIT (OUTPATIENT)
Dept: PHYSICAL THERAPY | Age: 66
End: 2021-07-06
Attending: PHYSICIAN ASSISTANT
Payer: COMMERCIAL

## 2021-07-06 PROCEDURE — 97110 THERAPEUTIC EXERCISES: CPT

## 2021-07-06 NOTE — PROGRESS NOTES
Dx: Aftercare following surgery (Z48.89)  S/p ORIF 6/11/21  Other closed fracture of proximal end of left ulna with routine healing, subsequent encounter (R92.217D         Insurance (Authorized # of Visits):  PPO 12 visits           Authorizing Physician: min       Putty for  strength     Cp x10 min CP x10 min Cp x10 min    HEP: 6/24/2021  Continue with HEP  6/29/2021  Use 4-8oz can from pantry for elbow extension stretch--3 -5 mins  7/6/2021  Yellow putty for  strength    Charges: Ex 3       Total

## 2021-07-07 ENCOUNTER — APPOINTMENT (OUTPATIENT)
Dept: PHYSICAL THERAPY | Age: 66
End: 2021-07-07
Attending: PHYSICIAN ASSISTANT
Payer: COMMERCIAL

## 2021-07-07 ENCOUNTER — HOSPITAL ENCOUNTER (OUTPATIENT)
Dept: GENERAL RADIOLOGY | Age: 66
Discharge: HOME | End: 2021-07-07
Attending: PHYSICIAN ASSISTANT
Payer: COMMERCIAL

## 2021-07-07 DIAGNOSIS — S52.092D OTHER CLOSED FRACTURE OF PROXIMAL END OF LEFT ULNA WITH ROUTINE HEALING, SUBSEQUENT ENCOUNTER: ICD-10-CM

## 2021-07-07 DIAGNOSIS — Z48.89 AFTERCARE FOLLOWING SURGERY: ICD-10-CM

## 2021-07-07 PROCEDURE — 73080 X-RAY EXAM OF ELBOW: CPT | Performed by: PHYSICIAN ASSISTANT

## 2021-07-08 ENCOUNTER — OFFICE VISIT (OUTPATIENT)
Dept: ORTHOPEDICS CLINIC | Facility: CLINIC | Age: 66
End: 2021-07-08
Payer: COMMERCIAL

## 2021-07-08 ENCOUNTER — OFFICE VISIT (OUTPATIENT)
Dept: PHYSICAL THERAPY | Age: 66
End: 2021-07-08
Attending: PHYSICIAN ASSISTANT
Payer: COMMERCIAL

## 2021-07-08 DIAGNOSIS — S52.092D OTHER CLOSED FRACTURE OF PROXIMAL END OF LEFT ULNA WITH ROUTINE HEALING, SUBSEQUENT ENCOUNTER: ICD-10-CM

## 2021-07-08 DIAGNOSIS — Z48.89 AFTERCARE FOLLOWING SURGERY: Primary | ICD-10-CM

## 2021-07-08 PROCEDURE — 97110 THERAPEUTIC EXERCISES: CPT

## 2021-07-08 PROCEDURE — 99024 POSTOP FOLLOW-UP VISIT: CPT | Performed by: ORTHOPAEDIC SURGERY

## 2021-07-08 NOTE — PROGRESS NOTES
Dx: Aftercare following surgery (Z48.89)  S/p ORIF 6/11/21  Other closed fracture of proximal end of left ulna with routine healing, subsequent encounter (J32.840H         Insurance (Authorized # of Visits):  PPO 12 visits           Authorizing Physician: Cp x10 min CP x10 min Cp x10 min CP x10 min   HEP: 6/24/2021  Continue with HEP  6/29/2021  Use 4-8oz can from pantry for elbow extension stretch--3 -5 mins  7/6/2021  Yellow putty for  strength  7/8/2021  Elbow flexion/ext, supination, pronation A/AA/

## 2021-07-08 NOTE — PROGRESS NOTES
[facility] Post-op Progress Note      Date of Surgery: 6/11/2021     History: Joseph Jamison is a 72-year-old female status post ORIF of a comminuted proximal ulna fracture who presents for follow-up about 1 month postop.   She is enrolled in physical th software. Errors may occur, which have been corrected when identified.

## 2021-07-13 ENCOUNTER — OFFICE VISIT (OUTPATIENT)
Dept: PHYSICAL THERAPY | Age: 66
End: 2021-07-13
Attending: PHYSICIAN ASSISTANT
Payer: COMMERCIAL

## 2021-07-13 PROCEDURE — 97110 THERAPEUTIC EXERCISES: CPT

## 2021-07-13 NOTE — PROGRESS NOTES
Dx: Aftercare following surgery (Z48.89)  S/p ORIF 6/11/21  Other closed fracture of proximal end of left ulna with routine healing, subsequent encounter (C12.646S         Insurance (Authorized # of Visits):  PPO 12 visits           Authorizing Physician:

## 2021-07-15 ENCOUNTER — OFFICE VISIT (OUTPATIENT)
Dept: PHYSICAL THERAPY | Age: 66
End: 2021-07-15
Attending: PHYSICIAN ASSISTANT
Payer: COMMERCIAL

## 2021-07-15 PROCEDURE — 97110 THERAPEUTIC EXERCISES: CPT

## 2021-07-15 NOTE — PROGRESS NOTES
Dx: Aftercare following surgery (Z48.89)  S/p ORIF 6/11/21  Other closed fracture of proximal end of left ulna with routine healing, subsequent encounter (P42.407O         Insurance (Authorized # of Visits):  PPO 12 visits           Authorizing Physician: Total Timed Treatment: 50 min  Total Treatment Time: 60 min

## 2021-07-20 ENCOUNTER — OFFICE VISIT (OUTPATIENT)
Dept: PHYSICAL THERAPY | Age: 66
End: 2021-07-20
Attending: PHYSICIAN ASSISTANT
Payer: COMMERCIAL

## 2021-07-20 ENCOUNTER — APPOINTMENT (OUTPATIENT)
Dept: PHYSICAL THERAPY | Age: 66
End: 2021-07-20
Attending: PHYSICIAN ASSISTANT
Payer: COMMERCIAL

## 2021-07-20 PROCEDURE — 97110 THERAPEUTIC EXERCISES: CPT

## 2021-07-22 ENCOUNTER — OFFICE VISIT (OUTPATIENT)
Dept: PHYSICAL THERAPY | Age: 66
End: 2021-07-22
Attending: PHYSICIAN ASSISTANT
Payer: COMMERCIAL

## 2021-07-22 PROCEDURE — 97110 THERAPEUTIC EXERCISES: CPT

## 2021-07-22 NOTE — PROGRESS NOTES
Dx: Aftercare following surgery (Z48.89)  S/p ORIF 6/11/21  Other closed fracture of proximal end of left ulna with routine healing, subsequent encounter (M52.134U         Insurance (Authorized # of Visits):  PPO 12 visits           Authorizing Physician: and arm in full pronation) 10x 3 sec ea  HBH shoulder ER stretches x10  PROM left elbow all planes to [place]elrance x30 min   STM effleurage left hand for edema x5 min HEP instruction See AVS --      AAROM left shoulder all planes x5 min -- -- AAROM left shoul

## 2021-07-27 ENCOUNTER — OFFICE VISIT (OUTPATIENT)
Dept: PHYSICAL THERAPY | Age: 66
End: 2021-07-27
Attending: PHYSICIAN ASSISTANT
Payer: COMMERCIAL

## 2021-07-27 PROCEDURE — 97110 THERAPEUTIC EXERCISES: CPT

## 2021-07-27 NOTE — PROGRESS NOTES
Dx: Aftercare following surgery (Z48.89)  S/p ORIF 6/11/21  Other closed fracture of proximal end of left ulna with routine healing, subsequent encounter (F22.550K         Insurance (Authorized # of Visits):  PPO 12 visits           Authorizing Physician: min   HEP: 6/24/2021  Continue with HEP  6/29/2021  Use 4-8oz can from pantry for elbow extension stretch--3 -5 mins  7/6/2021  Yellow putty for  strength  7/8/2021  Elbow flexion/ext, supination, pronation A/AA/PROM    Charges: Ex 3       Total Timed

## 2021-07-29 ENCOUNTER — OFFICE VISIT (OUTPATIENT)
Dept: PHYSICAL THERAPY | Age: 66
End: 2021-07-29
Attending: PHYSICIAN ASSISTANT
Payer: COMMERCIAL

## 2021-07-29 PROCEDURE — 97110 THERAPEUTIC EXERCISES: CPT

## 2021-08-02 ENCOUNTER — HOSPITAL ENCOUNTER (OUTPATIENT)
Dept: GENERAL RADIOLOGY | Age: 66
Discharge: HOME | End: 2021-08-02
Attending: ORTHOPAEDIC SURGERY
Payer: COMMERCIAL

## 2021-08-02 ENCOUNTER — TELEPHONE (OUTPATIENT)
Dept: PHYSICAL THERAPY | Facility: HOSPITAL | Age: 66
End: 2021-08-02

## 2021-08-02 ENCOUNTER — OFFICE VISIT (OUTPATIENT)
Dept: ORTHOPEDICS CLINIC | Facility: CLINIC | Age: 66
End: 2021-08-02
Payer: COMMERCIAL

## 2021-08-02 ENCOUNTER — OFFICE VISIT (OUTPATIENT)
Dept: OCCUPATIONAL MEDICINE | Age: 66
End: 2021-08-02
Attending: ORTHOPAEDIC SURGERY
Payer: COMMERCIAL

## 2021-08-02 VITALS — BODY MASS INDEX: 24.8 KG/M2 | HEIGHT: 63 IN | WEIGHT: 140 LBS

## 2021-08-02 DIAGNOSIS — S52.272D: ICD-10-CM

## 2021-08-02 DIAGNOSIS — Z48.89 AFTERCARE FOLLOWING SURGERY: ICD-10-CM

## 2021-08-02 DIAGNOSIS — M24.542 CONTRACTURE OF JOINT, HAND, LEFT: ICD-10-CM

## 2021-08-02 DIAGNOSIS — S52.022D OLECRANON FRACTURE, LEFT, CLOSED, WITH ROUTINE HEALING, SUBSEQUENT ENCOUNTER: ICD-10-CM

## 2021-08-02 DIAGNOSIS — M24.542 CONTRACTURE OF JOINT, HAND, LEFT: Primary | ICD-10-CM

## 2021-08-02 PROCEDURE — 97166 OT EVAL MOD COMPLEX 45 MIN: CPT

## 2021-08-02 PROCEDURE — 3008F BODY MASS INDEX DOCD: CPT | Performed by: ORTHOPAEDIC SURGERY

## 2021-08-02 PROCEDURE — 99213 OFFICE O/P EST LOW 20 MIN: CPT | Performed by: ORTHOPAEDIC SURGERY

## 2021-08-02 PROCEDURE — 73080 X-RAY EXAM OF ELBOW: CPT | Performed by: ORTHOPAEDIC SURGERY

## 2021-08-02 PROCEDURE — 97110 THERAPEUTIC EXERCISES: CPT

## 2021-08-02 NOTE — PROGRESS NOTES
[facility] Fracture follow-up Progress Note      Date of surgery:  6/11/2021    History: Alvena Lombard is a 61-year-old female presenting for postop follow-up after undergoing ORIF of a left elbow Monteggia type II fracture dislocation.   She continues only 7 weeks postop. Occupational therapy however would be beneficial to focus more on the hand contractures with continued work on end range of motion for the elbow in flexion and extension.   Full range of motion should be achieved by her next visit in 4

## 2021-08-02 NOTE — PROGRESS NOTES
OCCUPATIONAL THERAPY UPPER EXTREMITY EVALUATION   Referring Physician: Dr. 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 The results of the objective tests and measures show decreased wrist and digit ROM, edema at wrist, loss of /pinch strength, decreased [address] [place] and in-hand manipulation.   Fu sec, L=24 sec. Today’s Treatment and Response:   Pt education was provided on exam findings, treatment diagnosis, treatment plan, expectations, and prognosis.   Patient was instructed in and issued a HEP for: contrast bath, edema massage, tendon glides, have any questions, please contact me at Dept: [phone]    Sincerely,  Electronically signed by therapist: AGUSTÍN Brush/TYRONE  [de-identified] certification required: Yes  I certify the need for these services furnished under this plan of treatment

## 2021-08-03 ENCOUNTER — PATIENT MESSAGE (OUTPATIENT)
Dept: FAMILY MEDICINE CLINIC | Facility: CLINIC | Age: 66
End: 2021-08-03

## 2021-08-03 ENCOUNTER — OFFICE VISIT (OUTPATIENT)
Dept: PHYSICAL THERAPY | Age: 66
End: 2021-08-03
Attending: PHYSICIAN ASSISTANT
Payer: COMMERCIAL

## 2021-08-03 ENCOUNTER — OFFICE VISIT (OUTPATIENT)
Dept: OCCUPATIONAL MEDICINE | Age: 66
End: 2021-08-03
Attending: ORTHOPAEDIC SURGERY
Payer: COMMERCIAL

## 2021-08-03 ENCOUNTER — APPOINTMENT (OUTPATIENT)
Dept: PHYSICAL THERAPY | Age: 66
End: 2021-08-03
Attending: PHYSICIAN ASSISTANT
Payer: COMMERCIAL

## 2021-08-03 DIAGNOSIS — Z12.31 ENCOUNTER FOR SCREENING MAMMOGRAM FOR BREAST CANCER: Primary | ICD-10-CM

## 2021-08-03 DIAGNOSIS — Z78.0 POSTMENOPAUSAL ESTROGEN DEFICIENCY: ICD-10-CM

## 2021-08-03 PROCEDURE — 97140 MANUAL THERAPY 1/> REGIONS: CPT

## 2021-08-03 PROCEDURE — 97110 THERAPEUTIC EXERCISES: CPT

## 2021-08-03 PROCEDURE — 97022 WHIRLPOOL THERAPY: CPT

## 2021-08-03 NOTE — PROGRESS NOTES
Dx:  Monteggia's fracture of left ulna, subsequent encounter for closed fracture with routine healing (S52.891D)  Contracture of joint, hand, left (M24.542)              Insurance (Authorized # of Visits):  [facility] Physician: Dr. Marvin Duggan  Next

## 2021-08-03 NOTE — PROGRESS NOTES
Dx: Aftercare following surgery (Z48.89)  S/p ORIF 6/11/21  Other closed fracture of proximal end of left ulna with routine healing, subsequent encounter (Z72.101T         Insurance (Authorized # of Visits):  PPO 12 visits           Authorizing Physician: planes to [place]elrance x30 min  STM left bicep x3 min  Effleurage to left hand wrist x3 UBE x5 min  Wall press ups x15  Elbow flexion stretch at wall 15x 5 sec  HBH shoulder ER stretches x10  PROM left elbow all planes to [place]elrance x30 min  STM left bicep x3

## 2021-08-04 NOTE — TELEPHONE ENCOUNTER
From: Bienvenido Green  To: Abdelrahman Sheppard MD  Sent: 8/3/2021 4:49 PM CDT  Subject: Referral Request    Sylvester Farias,    Could you please submit an order for an annual mammogram for myself?      Additionally, can you submit an order for a bone density s

## 2021-08-05 ENCOUNTER — OFFICE VISIT (OUTPATIENT)
Dept: PHYSICAL THERAPY | Age: 66
End: 2021-08-05
Attending: PHYSICIAN ASSISTANT
Payer: COMMERCIAL

## 2021-08-05 ENCOUNTER — APPOINTMENT (OUTPATIENT)
Dept: PHYSICAL THERAPY | Age: 66
End: 2021-08-05
Attending: PHYSICIAN ASSISTANT
Payer: COMMERCIAL

## 2021-08-05 PROCEDURE — 97110 THERAPEUTIC EXERCISES: CPT

## 2021-08-05 NOTE — PROGRESS NOTES
Dx: Aftercare following surgery (Z48.89)  S/p ORIF 6/11/21  Other closed fracture of proximal end of left ulna with routine healing, subsequent encounter (M82.096G         Insurance (Authorized # of Visits):  PPO 12 visits           Authorizing Physician: left hand wrist x3 UBE x5 min  Wall press ups x15  Elbow flexion stretch at wall 15x 5 sec  HBH shoulder ER stretches x10  PROM left elbow all planes to [place]elrance x30 min  STM left bicep x3 min UBE x5 min  Wall press ups x15  Elbow flexion stretch at wall

## 2021-08-09 ENCOUNTER — OFFICE VISIT (OUTPATIENT)
Dept: PHYSICAL THERAPY | Age: 66
End: 2021-08-09
Attending: PHYSICIAN ASSISTANT
Payer: COMMERCIAL

## 2021-08-09 ENCOUNTER — APPOINTMENT (OUTPATIENT)
Dept: PHYSICAL THERAPY | Age: 66
End: 2021-08-09
Attending: PHYSICIAN ASSISTANT
Payer: COMMERCIAL

## 2021-08-09 ENCOUNTER — MED REC SCAN ONLY (OUTPATIENT)
Dept: ORTHOPEDICS CLINIC | Facility: CLINIC | Age: 66
End: 2021-08-09

## 2021-08-09 PROCEDURE — 97110 THERAPEUTIC EXERCISES: CPT

## 2021-08-09 NOTE — PROGRESS NOTES
Dx: Aftercare following surgery (Z48.89)  S/p ORIF 6/11/21  Other closed fracture of proximal end of left ulna with routine healing, subsequent encounter (I42.984P         Insurance (Authorized # of Visits):  PPO 12 visits           Authorizing Physician: stretches 2x10  Supine: elbow extension with arm in 90 deg flexion x10  PROM left elbow all planes to [place]elrance x30 min                           CP x10 min CPx 10 min CP x10 min CP x10 min   HEP: 6/24/2021  Continue with HEP  6/29/2021  Use 4-8oz can from

## 2021-08-10 ENCOUNTER — OFFICE VISIT (OUTPATIENT)
Dept: OCCUPATIONAL MEDICINE | Age: 66
End: 2021-08-10
Attending: ORTHOPAEDIC SURGERY
Payer: COMMERCIAL

## 2021-08-10 PROCEDURE — 97110 THERAPEUTIC EXERCISES: CPT

## 2021-08-10 PROCEDURE — 97140 MANUAL THERAPY 1/> REGIONS: CPT

## 2021-08-10 NOTE — PROGRESS NOTES
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 Jose Barnett extension/ x 20      HEP: HEP upgrades in bold     Charges: 2 MT, 1 TE     Total Timed Treatment: 45 min  Total Treatment Time: 45 min

## 2021-08-11 ENCOUNTER — HOSPITAL ENCOUNTER (OUTPATIENT)
Dept: BONE DENSITY | Age: 66
Discharge: HOME | End: 2021-08-11
Attending: FAMILY MEDICINE
Payer: COMMERCIAL

## 2021-08-11 DIAGNOSIS — Z78.0 POSTMENOPAUSAL ESTROGEN DEFICIENCY: ICD-10-CM

## 2021-08-11 PROCEDURE — 77080 DXA BONE DENSITY AXIAL: CPT | Performed by: FAMILY MEDICINE

## 2021-08-12 ENCOUNTER — OFFICE VISIT (OUTPATIENT)
Dept: PHYSICAL THERAPY | Age: 66
End: 2021-08-12
Attending: PHYSICIAN ASSISTANT
Payer: COMMERCIAL

## 2021-08-12 ENCOUNTER — APPOINTMENT (OUTPATIENT)
Dept: PHYSICAL THERAPY | Age: 66
End: 2021-08-12
Attending: PHYSICIAN ASSISTANT
Payer: COMMERCIAL

## 2021-08-12 PROCEDURE — 97110 THERAPEUTIC EXERCISES: CPT

## 2021-08-12 NOTE — PROGRESS NOTES
Dx: Aftercare following surgery (Z48.89)  S/p ORIF 6/11/21  Other closed fracture of proximal end of left ulna with routine healing, subsequent encounter (N42.858V         Insurance (Authorized # of Visits):  PPO 12 visits           Authorizing Physician: [phone]. Sincerely,  Electronically signed by therapist: Bessie Steven PT    Physician's certification required: No  Please co-sign or sign and return this letter via fax as soon as possible to [phone].    I certify the need for these services min  Total Treatment Time: 60 min

## 2021-08-13 ENCOUNTER — OFFICE VISIT (OUTPATIENT)
Dept: OCCUPATIONAL MEDICINE | Age: 66
End: 2021-08-13
Attending: ORTHOPAEDIC SURGERY
Payer: COMMERCIAL

## 2021-08-13 DIAGNOSIS — S52.272D: ICD-10-CM

## 2021-08-13 DIAGNOSIS — M24.542 CONTRACTURE OF JOINT, HAND, LEFT: ICD-10-CM

## 2021-08-13 PROCEDURE — 97140 MANUAL THERAPY 1/> REGIONS: CPT

## 2021-08-13 PROCEDURE — 97110 THERAPEUTIC EXERCISES: CPT

## 2021-08-13 NOTE — PROGRESS NOTES
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 Ye Gresham  Next pinch x 20  Lateral pinch with green x 20 Blue power web  Full  x20  Digit extension x 20    Perfection for in-hand manipulation      Blokus for in hand manipulation       Beige flex bar  Wrist flexion/ x 20  Wrist extension/ x 20      HEP: HEP

## 2021-08-16 ENCOUNTER — OFFICE VISIT (OUTPATIENT)
Dept: OCCUPATIONAL MEDICINE | Age: 66
End: 2021-08-16
Attending: ORTHOPAEDIC SURGERY
Payer: COMMERCIAL

## 2021-08-16 DIAGNOSIS — M24.542 CONTRACTURE OF JOINT, HAND, LEFT: ICD-10-CM

## 2021-08-16 DIAGNOSIS — S52.272D: ICD-10-CM

## 2021-08-16 PROCEDURE — 97110 THERAPEUTIC EXERCISES: CPT

## 2021-08-16 PROCEDURE — 97140 MANUAL THERAPY 1/> REGIONS: CPT

## 2021-08-16 NOTE — PROGRESS NOTES
Dx:  Monteggia's fracture of left ulna, subsequent encounter for closed fracture with routine healing (S52.100D)  Contracture of joint, hand, left (M24.542)              Insurance (Authorized # of Visits):  [facility] Physician: Dr. Toyin Paz  Next Dept: [phone]. Sincerely,  Electronically signed by therapist: Mercy Lance OTR/TYRONE    Physician's certification required:  No  Please co-sign or sign and return this letter via fax as soon as possible to [phone].    I certify the need for

## 2021-09-09 ENCOUNTER — HOSPITAL ENCOUNTER (OUTPATIENT)
Dept: GENERAL RADIOLOGY | Age: 66
Discharge: HOME | End: 2021-09-09
Attending: ORTHOPAEDIC SURGERY
Payer: COMMERCIAL

## 2021-09-09 ENCOUNTER — OFFICE VISIT (OUTPATIENT)
Dept: ORTHOPEDICS CLINIC | Facility: CLINIC | Age: 66
End: 2021-09-09
Payer: COMMERCIAL

## 2021-09-09 VITALS — BODY MASS INDEX: 25.69 KG/M2 | HEIGHT: 63 IN | WEIGHT: 145 LBS

## 2021-09-09 DIAGNOSIS — S52.272D: ICD-10-CM

## 2021-09-09 DIAGNOSIS — M24.542 CONTRACTURE OF JOINT, HAND, LEFT: ICD-10-CM

## 2021-09-09 DIAGNOSIS — S52.272D: Primary | ICD-10-CM

## 2021-09-09 DIAGNOSIS — Z48.89 AFTERCARE FOLLOWING SURGERY: ICD-10-CM

## 2021-09-09 PROCEDURE — 73080 X-RAY EXAM OF ELBOW: CPT | Performed by: ORTHOPAEDIC SURGERY

## 2021-09-09 PROCEDURE — 3008F BODY MASS INDEX DOCD: CPT | Performed by: ORTHOPAEDIC SURGERY

## 2021-09-09 PROCEDURE — 99024 POSTOP FOLLOW-UP VISIT: CPT | Performed by: ORTHOPAEDIC SURGERY

## 2021-09-09 NOTE — PROGRESS NOTES
[facility] Post-op Progress Note      Date of Surgery: 6/11/2021      History: Parag De Los Santos is approximately 3 months status post ORIF of a comminuted left olecranon fracture. She has been out of [facility] in [place] over the past month.     Phys

## 2021-09-10 ENCOUNTER — OFFICE VISIT (OUTPATIENT)
Dept: OCCUPATIONAL MEDICINE | Age: 66
End: 2021-09-10
Attending: ORTHOPAEDIC SURGERY
Payer: COMMERCIAL

## 2021-09-10 DIAGNOSIS — S52.272D: ICD-10-CM

## 2021-09-10 DIAGNOSIS — M24.542 CONTRACTURE OF JOINT, HAND, LEFT: ICD-10-CM

## 2021-09-10 PROCEDURE — 97110 THERAPEUTIC EXERCISES: CPT

## 2021-09-10 PROCEDURE — 97140 MANUAL THERAPY 1/> REGIONS: CPT

## 2021-09-10 NOTE — PROGRESS NOTES
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 Ananda Maynard  Next [phone]. I certify the need for these services furnished under this plan of treatment and while under my care.     X___________________________________________________ Date____________________    Certification From: 3/05/7783  To:11/14/2021   Date: 8

## 2021-09-13 ENCOUNTER — OFFICE VISIT (OUTPATIENT)
Dept: OCCUPATIONAL MEDICINE | Age: 66
End: 2021-09-13
Attending: ORTHOPAEDIC SURGERY
Payer: COMMERCIAL

## 2021-09-13 DIAGNOSIS — S52.272D: ICD-10-CM

## 2021-09-13 DIAGNOSIS — M24.542 CONTRACTURE OF JOINT, HAND, LEFT: ICD-10-CM

## 2021-09-13 PROCEDURE — 97140 MANUAL THERAPY 1/> REGIONS: CPT

## 2021-09-13 PROCEDURE — 97110 THERAPEUTIC EXERCISES: CPT

## 2021-09-13 NOTE — PROGRESS NOTES
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 Duyen Chung  Next under this plan of treatment and while under my care.     X___________________________________________________ Date____________________    Certification From: 9/43/2934  To:11/14/2021   Date: 8/3/2021  TX#: 2/8 Date:  8/10/21               TX#: 3/8 Date: 8/ bar  Wrist flexion/ x 20  Wrist extension/ x 20   EDC glides    HEP: HEP upgrades in bold     Charges: 1 MT, 2 TE     Total Timed Treatment: 45 min  Total Treatment Time: 45 min

## 2021-09-16 ENCOUNTER — OFFICE VISIT (OUTPATIENT)
Dept: OCCUPATIONAL MEDICINE | Age: 66
End: 2021-09-16
Attending: ORTHOPAEDIC SURGERY
Payer: COMMERCIAL

## 2021-09-16 DIAGNOSIS — S52.272D: ICD-10-CM

## 2021-09-16 DIAGNOSIS — M24.542 CONTRACTURE OF JOINT, HAND, LEFT: ICD-10-CM

## 2021-09-16 PROCEDURE — 97110 THERAPEUTIC EXERCISES: CPT

## 2021-09-16 PROCEDURE — 97140 MANUAL THERAPY 1/> REGIONS: CPT

## 2021-09-16 NOTE — PROGRESS NOTES
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 Lala Mujica  Next [phone]. I certify the need for these services furnished under this plan of treatment and while under my care.     X___________________________________________________ Date____________________    Certification From: 0/66/7854  To:11/14/2021   Date: 8 manipulation G, Blue, Black clothespins using tripod pinch to place, lateral pinch to remove Prayer stretch with digit lift off In-hand manipulation with tiny pegs Green therapy paw   Full  x 20  /pull x 20    Blokus for in hand manipulation  Benito Khan

## 2021-09-20 ENCOUNTER — OFFICE VISIT (OUTPATIENT)
Dept: OCCUPATIONAL MEDICINE | Age: 66
End: 2021-09-20
Attending: ORTHOPAEDIC SURGERY
Payer: COMMERCIAL

## 2021-09-20 DIAGNOSIS — M24.542 CONTRACTURE OF JOINT, HAND, LEFT: ICD-10-CM

## 2021-09-20 DIAGNOSIS — S52.272D: ICD-10-CM

## 2021-09-20 PROCEDURE — 97140 MANUAL THERAPY 1/> REGIONS: CPT

## 2021-09-20 PROCEDURE — 97110 THERAPEUTIC EXERCISES: CPT

## 2021-09-20 NOTE — PROGRESS NOTES
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 Savita Dugan  Next JUN CastilloR/L    Physician's certification required:  No  Please co-sign or sign and return this letter via fax as soon as possible to [phone]. I certify the need for these services furnished under this plan of treatment and while under my care. bend  -UD  -RD  Each x 20   Wrist twister horizontal x 20  Vertical x 20 PREs 2 lb  -wrist extension  -wrist flexion  -RD/UD  Each x 20   Towel Walk x 10  Towel wring x 10 Digiflex green full  x 20  Red each digit x 15  Tripod pinch x 20  Lateral pinch

## 2021-09-22 ENCOUNTER — HOSPITAL ENCOUNTER (OUTPATIENT)
Dept: MAMMOGRAPHY | Age: 66
Discharge: HOME | End: 2021-09-22
Attending: FAMILY MEDICINE
Payer: COMMERCIAL

## 2021-09-22 DIAGNOSIS — Z12.31 ENCOUNTER FOR SCREENING MAMMOGRAM FOR BREAST CANCER: ICD-10-CM

## 2021-09-22 PROCEDURE — 77067 SCR MAMMO BI INCL CAD: CPT | Performed by: FAMILY MEDICINE

## 2021-09-22 PROCEDURE — 77063 BREAST TOMOSYNTHESIS BI: CPT | Performed by: FAMILY MEDICINE

## 2021-09-23 ENCOUNTER — APPOINTMENT (OUTPATIENT)
Dept: OCCUPATIONAL MEDICINE | Age: 66
End: 2021-09-23
Attending: ORTHOPAEDIC SURGERY
Payer: COMMERCIAL

## 2021-09-27 ENCOUNTER — APPOINTMENT (OUTPATIENT)
Dept: OCCUPATIONAL MEDICINE | Age: 66
End: 2021-09-27
Attending: ORTHOPAEDIC SURGERY
Payer: COMMERCIAL

## 2021-09-29 DIAGNOSIS — N02.8 IGA NEPHROPATHY: ICD-10-CM

## 2021-09-30 ENCOUNTER — APPOINTMENT (OUTPATIENT)
Dept: OCCUPATIONAL MEDICINE | Age: 66
End: 2021-09-30
Attending: ORTHOPAEDIC SURGERY
Payer: COMMERCIAL

## 2021-10-06 RX ORDER — LISINOPRIL 20 MG/1
TABLET ORAL
Qty: 180 TABLET | Refills: 0 | Status: SHIPPED | OUTPATIENT
Start: 2021-10-06 | End: 2021-12-20

## 2021-10-06 NOTE — TELEPHONE ENCOUNTER
Requested Prescriptions     Pending Prescriptions Disp Refills   • LISINOPRIL 20 MG Oral Tab [Pharmacy Med Name: Lisinopril Oral Tablet 20 MG] 180 tablet 0     Sig: TAKE 1 TABLET BY MOUTH TWO TIMES A DAY     LOV 11/9/2020     Patient was asked to follow-up

## 2021-10-27 NOTE — TELEPHONE ENCOUNTER
Patient refused appointment and said she fine . I advised her that she will need a bp check appointment before refills are given again.

## 2021-12-13 ENCOUNTER — HOSPITAL ENCOUNTER (OUTPATIENT)
Dept: GENERAL RADIOLOGY | Age: 66
Discharge: HOME | End: 2021-12-13
Attending: ORTHOPAEDIC SURGERY
Payer: COMMERCIAL

## 2021-12-13 DIAGNOSIS — S52.272D: ICD-10-CM

## 2021-12-13 PROCEDURE — 73080 X-RAY EXAM OF ELBOW: CPT | Performed by: ORTHOPAEDIC SURGERY

## 2021-12-20 ENCOUNTER — OFFICE VISIT (OUTPATIENT)
Dept: FAMILY MEDICINE CLINIC | Facility: CLINIC | Age: 66
End: 2021-12-20
Payer: COMMERCIAL

## 2021-12-20 VITALS
HEART RATE: 72 BPM | DIASTOLIC BLOOD PRESSURE: 90 MMHG | TEMPERATURE: 99 F | WEIGHT: 148 LBS | SYSTOLIC BLOOD PRESSURE: 166 MMHG | HEIGHT: 63 IN | RESPIRATION RATE: 16 BRPM | BODY MASS INDEX: 26.22 KG/M2

## 2021-12-20 DIAGNOSIS — Z00.00 ROUTINE GENERAL MEDICAL EXAMINATION AT A HEALTH CARE FACILITY: ICD-10-CM

## 2021-12-20 DIAGNOSIS — K62.5 RECTAL BLEEDING: ICD-10-CM

## 2021-12-20 DIAGNOSIS — I10 ESSENTIAL HYPERTENSION: Primary | ICD-10-CM

## 2021-12-20 DIAGNOSIS — N02.8 IGA NEPHROPATHY: ICD-10-CM

## 2021-12-20 PROCEDURE — 3077F SYST BP >= 140 MM HG: CPT | Performed by: FAMILY MEDICINE

## 2021-12-20 PROCEDURE — 99214 OFFICE O/P EST MOD 30 MIN: CPT | Performed by: FAMILY MEDICINE

## 2021-12-20 PROCEDURE — 3080F DIAST BP >= 90 MM HG: CPT | Performed by: FAMILY MEDICINE

## 2021-12-20 PROCEDURE — 3008F BODY MASS INDEX DOCD: CPT | Performed by: FAMILY MEDICINE

## 2021-12-20 RX ORDER — LISINOPRIL 20 MG/1
20 TABLET ORAL 2 TIMES DAILY
Qty: 180 TABLET | Refills: 3 | Status: SHIPPED | OUTPATIENT
Start: 2021-12-20 | End: 2022-01-21

## 2021-12-20 NOTE — PROGRESS NOTES
Mica Ndiaye is a 61year old female. HPI:   Patient presents with rectal bleeding. Started 10 days ago after BM. Light stretching on TP. No clots or blood in/around stool.   Denies pain with BM  Just got covid booster, felt rectal bleeding increas headaches  PSYCH: mood is good    EXAM:   BP (!) 166/90   Pulse 72   Temp 98.8 °F (37.1 °C) (Tympanic)   Resp 16   Ht 5' 3\" (1.6 m)   Wt 148 lb (67.1 kg)   LMP 12/23/2008   BMI 26.22 kg/m²   GENERAL: well developed, well nourished,in no apparent distress Ratio, Random Urine      Urinalysis, Routine [E]    Meds & Refills for this Visit:  Requested Prescriptions     Signed Prescriptions Disp Refills   • lisinopril 20 MG Oral Tab 180 tablet 3     Sig: Take 1 tablet (20 mg total) by mouth 2 (two) times daily.

## 2021-12-31 ENCOUNTER — LAB ENCOUNTER (OUTPATIENT)
Dept: LAB | Age: 66
End: 2021-12-31
Attending: FAMILY MEDICINE
Payer: COMMERCIAL

## 2021-12-31 DIAGNOSIS — N02.8 IGA NEPHROPATHY: ICD-10-CM

## 2021-12-31 DIAGNOSIS — Z00.00 ROUTINE GENERAL MEDICAL EXAMINATION AT A HEALTH CARE FACILITY: ICD-10-CM

## 2021-12-31 LAB
ALBUMIN SERPL-MCNC: 4.2 G/DL (ref 3.4–5)
ALBUMIN/GLOB SERPL: 1.2 {RATIO} (ref 1–2)
ALP LIVER SERPL-CCNC: 119 U/L
ALT SERPL-CCNC: 27 U/L
ANION GAP SERPL CALC-SCNC: 4 MMOL/L (ref 0–18)
AST SERPL-CCNC: 21 U/L (ref 15–37)
BILIRUB SERPL-MCNC: 0.6 MG/DL (ref 0.1–2)
BILIRUB UR QL STRIP.AUTO: NEGATIVE
BUN BLD-MCNC: 24 MG/DL (ref 7–18)
CALCIUM BLD-MCNC: 9.8 MG/DL (ref 8.5–10.1)
CHLORIDE SERPL-SCNC: 103 MMOL/L (ref 98–112)
CHOLEST SERPL-MCNC: 179 MG/DL (ref ?–200)
CLARITY UR REFRACT.AUTO: CLEAR
CO2 SERPL-SCNC: 31 MMOL/L (ref 21–32)
CREAT BLD-MCNC: 1.09 MG/DL
CREAT UR-SCNC: 33.6 MG/DL
ERYTHROCYTE [DISTWIDTH] IN BLOOD BY AUTOMATED COUNT: 12.9 %
FASTING PATIENT LIPID ANSWER: YES
FASTING STATUS PATIENT QL REPORTED: YES
GLOBULIN PLAS-MCNC: 3.4 G/DL (ref 2.8–4.4)
GLUCOSE BLD-MCNC: 95 MG/DL (ref 70–99)
GLUCOSE UR STRIP.AUTO-MCNC: NEGATIVE MG/DL
HCT VFR BLD AUTO: 47.3 %
HDLC SERPL-MCNC: 50 MG/DL (ref 40–59)
HGB BLD-MCNC: 15.3 G/DL
KETONES UR STRIP.AUTO-MCNC: NEGATIVE MG/DL
LDLC SERPL CALC-MCNC: 107 MG/DL (ref ?–100)
MCH RBC QN AUTO: 30.2 PG (ref 26–34)
MCHC RBC AUTO-ENTMCNC: 32.3 G/DL (ref 31–37)
MCV RBC AUTO: 93.3 FL
MICROALBUMIN UR-MCNC: <0.5 MG/DL
NITRITE UR QL STRIP.AUTO: NEGATIVE
NONHDLC SERPL-MCNC: 129 MG/DL (ref ?–130)
OSMOLALITY SERPL CALC.SUM OF ELEC: 290 MOSM/KG (ref 275–295)
PH UR STRIP.AUTO: 6 [PH] (ref 5–8)
PLATELET # BLD AUTO: 346 10(3)UL (ref 150–450)
POTASSIUM SERPL-SCNC: 4.7 MMOL/L (ref 3.5–5.1)
PROT SERPL-MCNC: 7.6 G/DL (ref 6.4–8.2)
PROT UR STRIP.AUTO-MCNC: NEGATIVE MG/DL
RBC # BLD AUTO: 5.07 X10(6)UL
RBC UR QL AUTO: NEGATIVE
SODIUM SERPL-SCNC: 138 MMOL/L (ref 136–145)
SP GR UR STRIP.AUTO: 1.01 (ref 1–1.03)
TRIGL SERPL-MCNC: 125 MG/DL (ref 30–149)
TSI SER-ACNC: 3.71 MIU/ML (ref 0.36–3.74)
UROBILINOGEN UR STRIP.AUTO-MCNC: <2 MG/DL
VLDLC SERPL CALC-MCNC: 21 MG/DL (ref 0–30)
WBC # BLD AUTO: 5.6 X10(3) UL (ref 4–11)

## 2021-12-31 PROCEDURE — 36415 COLL VENOUS BLD VENIPUNCTURE: CPT

## 2021-12-31 PROCEDURE — 80053 COMPREHEN METABOLIC PANEL: CPT

## 2021-12-31 PROCEDURE — 80061 LIPID PANEL: CPT

## 2021-12-31 PROCEDURE — 82043 UR ALBUMIN QUANTITATIVE: CPT

## 2021-12-31 PROCEDURE — 84443 ASSAY THYROID STIM HORMONE: CPT

## 2021-12-31 PROCEDURE — 85027 COMPLETE CBC AUTOMATED: CPT

## 2021-12-31 PROCEDURE — 81001 URINALYSIS AUTO W/SCOPE: CPT

## 2021-12-31 PROCEDURE — 82570 ASSAY OF URINE CREATININE: CPT

## 2022-01-01 NOTE — PROGRESS NOTES
Dx: Aftercare following surgery (Z48.89)  S/p ORIF 6/11/21  Other closed fracture of proximal end of left ulna with routine healing, subsequent encounter (Q82.583Y         Insurance (Authorized # of Visits):  PPO 12 visits           Authorizing Physician: [place] Admission:  Provider Notification    Girl Roseanna Pugh is a Gestational Age: 38w2d infant, delivered on 2022 at 10:10 AM    Birth  Delivery Method: Vaginal, Vacuum (Extractor) [254]   Rupture date & time: 2022 12:43 PM   Date & time of birth 20[birth year] 10:10 AM     Placenta appearance: Intact       Birth Measurements:Weight: 7 lb 5 oz (3317 g)  Height: 21\"  OFC:        Resuscitation:  Suctioning          APGARS  One minute Five minutes   Skin color: 0  1    Heart rate: 2  2     Reflex: 2  2    Muscle tone: 1  1    Breathin  2    Totals:   7  8         Sepsis:    Risk Scores: Risk - Well Appearin.38; Risk - Equivocal: 4.66   Sepsis Classification: (most recent)        Feeding  [place] is currently being fed      Cord Blood/[place] Evaluation (CRD)  No results found  Blood gases sent? No   Cord pH No results found    Pregnancy  Mom's age:  Information for the patient's mother:  Roseanna Pugh \"Susu\" [[id number]]   30 year old   OB History    Para Term  AB Living   1 0 0 0 0 0   SAB IAB Ectopic Molar Multiple Live Births   0 0 0 0 0 0       Group B Strep Status:   Information for the patient's mother:  Roseanna Pugh \"Susu\" [[id number]]     Recent Labs   Lab 22  1101   GBS Negative for  Streptococcus agalactiae (Strep group B)   ·   No antibiotics needed.    Prenatal Labs:  Information for the patient's mother:  Roseanna Pugh \"Susu\" [[id number]]     HIV Antigen/ Antibody Combo Screen (no units)   Date Value   2022 Nonreactive     RPR (no units)   Date Value   2022 Nonreactive     Neisseria gonorrhoeae by Nucleic Acid Amplification (no units)   Date Value   2022 Negative     Chlamydia trachomatis by Nucleic Acid Amplification (no units)   Date Value   2022 Negative     Rubella Antibody, IgG (Units/mL)   Date Value   2022 8.0 (L)         Complications/Risks of this pregnancy:  · None       Date:2022 Time:12:00 PM   stretch--3 -5 mins  7/6/2021  Yellow putty for  strength  7/8/2021  Elbow flexion/ext, supination, pronation A/AA/PROM    Charges: Ex 3       Total Timed Treatment: 50 min  Total Treatment Time: 60 min

## 2022-01-21 ENCOUNTER — OFFICE VISIT (OUTPATIENT)
Dept: FAMILY MEDICINE CLINIC | Facility: CLINIC | Age: 67
End: 2022-01-21
Payer: COMMERCIAL

## 2022-01-21 VITALS
DIASTOLIC BLOOD PRESSURE: 72 MMHG | SYSTOLIC BLOOD PRESSURE: 122 MMHG | BODY MASS INDEX: 25.76 KG/M2 | HEIGHT: 63 IN | WEIGHT: 145.38 LBS | HEART RATE: 88 BPM

## 2022-01-21 DIAGNOSIS — I10 ESSENTIAL HYPERTENSION: Primary | ICD-10-CM

## 2022-01-21 DIAGNOSIS — N02.8 IGA NEPHROPATHY: ICD-10-CM

## 2022-01-21 PROCEDURE — 3078F DIAST BP <80 MM HG: CPT | Performed by: FAMILY MEDICINE

## 2022-01-21 PROCEDURE — 3074F SYST BP LT 130 MM HG: CPT | Performed by: FAMILY MEDICINE

## 2022-01-21 PROCEDURE — 99213 OFFICE O/P EST LOW 20 MIN: CPT | Performed by: FAMILY MEDICINE

## 2022-01-21 PROCEDURE — 3008F BODY MASS INDEX DOCD: CPT | Performed by: FAMILY MEDICINE

## 2022-01-21 RX ORDER — LISINOPRIL 20 MG/1
20 TABLET ORAL 2 TIMES DAILY
Qty: 180 TABLET | Refills: 3 | Status: SHIPPED | OUTPATIENT
Start: 2022-01-21

## 2022-01-21 NOTE — PROGRESS NOTES
Thien Watkins is a 61year old female. HPI:   Patient presents for a BP follow up.   home readings - systolic 501-862, diastolic 66-62. Treated with lisinopril 20mg daily. History of IgA nephropathy. She has seen Dr. Rashad Miranda.   Last visit wit developed, well nourished,in no apparent distress  NECK: supple,no adenopathy,no carotid bruits, no thyromegaly  LUNGS: clear to auscultation  CARDIO: RRR without murmur  EXT:  No pedal edema    ASSESSMENT AND PLAN:   1.  Essential hypertension  BP well con

## 2022-03-11 ENCOUNTER — TELEPHONE (OUTPATIENT)
Dept: FAMILY MEDICINE CLINIC | Facility: CLINIC | Age: 67
End: 2022-03-11

## 2022-03-11 ENCOUNTER — OFFICE VISIT (OUTPATIENT)
Dept: FAMILY MEDICINE CLINIC | Facility: CLINIC | Age: 67
End: 2022-03-11
Payer: COMMERCIAL

## 2022-03-11 DIAGNOSIS — K04.7 DENTAL INFECTION: Primary | ICD-10-CM

## 2022-03-11 PROCEDURE — 99213 OFFICE O/P EST LOW 20 MIN: CPT | Performed by: FAMILY MEDICINE

## 2022-03-11 PROCEDURE — 3079F DIAST BP 80-89 MM HG: CPT | Performed by: FAMILY MEDICINE

## 2022-03-11 PROCEDURE — 3075F SYST BP GE 130 - 139MM HG: CPT | Performed by: FAMILY MEDICINE

## 2022-03-11 RX ORDER — VALACYCLOVIR HYDROCHLORIDE 1 G/1
1000 TABLET, FILM COATED ORAL 3 TIMES DAILY
Qty: 21 TABLET | Refills: 0 | Status: SHIPPED | OUTPATIENT
Start: 2022-03-11 | End: 2022-03-14 | Stop reason: ALTCHOICE

## 2022-03-11 NOTE — TELEPHONE ENCOUNTER
Pain started Wednesday evening as tooth pain now pain is in her ear. She saw the dentist and they took x-rays and looked and found no dental reason for the pain.  I talked to her and she will go to [address] to be seen today

## 2022-03-11 NOTE — TELEPHONE ENCOUNTER
Patient is calling she has had teeth pain since Wednesday and since last night her ear feels like she may have fluid in it. She went to the dentist and they said every thing checked out as far as the teeth went.

## 2022-03-11 NOTE — PATIENT INSTRUCTIONS
Take valtrex-- 1 tab three times daily for 7 days. Take with food to avoid nausea if needed. Continue pain control as needed. Increase fluids and rest as you are able. If no better in 2-3 days, follow-up with PCP for further evaluation.

## 2022-03-13 ENCOUNTER — PATIENT MESSAGE (OUTPATIENT)
Dept: FAMILY MEDICINE CLINIC | Facility: CLINIC | Age: 67
End: 2022-03-13

## 2022-03-13 VITALS
RESPIRATION RATE: 16 BRPM | HEART RATE: 80 BPM | SYSTOLIC BLOOD PRESSURE: 130 MMHG | DIASTOLIC BLOOD PRESSURE: 82 MMHG | TEMPERATURE: 98 F

## 2022-03-13 RX ORDER — AMOXICILLIN AND CLAVULANATE POTASSIUM 875; 125 MG/1; MG/1
1 TABLET, FILM COATED ORAL 2 TIMES DAILY
Qty: 20 TABLET | Refills: 0 | Status: SHIPPED | OUTPATIENT
Start: 2022-03-13 | End: 2022-03-23

## 2022-03-14 ENCOUNTER — OFFICE VISIT (OUTPATIENT)
Dept: FAMILY MEDICINE CLINIC | Facility: CLINIC | Age: 67
End: 2022-03-14
Payer: COMMERCIAL

## 2022-03-14 ENCOUNTER — TELEPHONE (OUTPATIENT)
Dept: FAMILY MEDICINE CLINIC | Facility: CLINIC | Age: 67
End: 2022-03-14

## 2022-03-14 VITALS
WEIGHT: 146 LBS | RESPIRATION RATE: 16 BRPM | HEART RATE: 68 BPM | DIASTOLIC BLOOD PRESSURE: 70 MMHG | TEMPERATURE: 99 F | SYSTOLIC BLOOD PRESSURE: 140 MMHG | BODY MASS INDEX: 25.87 KG/M2 | HEIGHT: 63 IN

## 2022-03-14 DIAGNOSIS — R68.84 JAW PAIN: Primary | ICD-10-CM

## 2022-03-14 PROCEDURE — 3078F DIAST BP <80 MM HG: CPT | Performed by: FAMILY MEDICINE

## 2022-03-14 PROCEDURE — 99214 OFFICE O/P EST MOD 30 MIN: CPT | Performed by: FAMILY MEDICINE

## 2022-03-14 PROCEDURE — 3077F SYST BP >= 140 MM HG: CPT | Performed by: FAMILY MEDICINE

## 2022-03-14 PROCEDURE — 3008F BODY MASS INDEX DOCD: CPT | Performed by: FAMILY MEDICINE

## 2022-03-14 RX ORDER — TRAMADOL HYDROCHLORIDE 50 MG/1
50 TABLET ORAL EVERY 8 HOURS PRN
Qty: 12 TABLET | Refills: 0 | Status: SHIPPED | OUTPATIENT
Start: 2022-03-14 | End: 2022-03-22

## 2022-03-14 RX ORDER — PREDNISONE 20 MG/1
40 TABLET ORAL DAILY
Qty: 10 TABLET | Refills: 0 | Status: SHIPPED | OUTPATIENT
Start: 2022-03-14 | End: 2022-03-19

## 2022-03-14 NOTE — TELEPHONE ENCOUNTER
Patient notified. She verbalized understanding and agrees with plan. She wishes to be seen today. Appt scheduled.

## 2022-03-14 NOTE — TELEPHONE ENCOUNTER
Please have her schedule appt this week with me to [facility] in case sx do not improve. If pain persist, she may need imaging.

## 2022-03-14 NOTE — TELEPHONE ENCOUNTER
Pt reports she is still in a lot pain. Has taken 3 doses of Augmentin so far. She states her dentist found nothing. Dental XR was negative. Pain rated 8 or 9 out of 10 at this time. Has been taking Tylenol 500mg for pain. Cannot take NSAIDs due to kidney hx. Pain is all in right jaw where the neck and jaw connect. She feels a swollen gland in this area. She notes no improvement in sx with Valtrex or Augmentin. Offered appt, but pt unsure what else can be done. Should she give abx more time or come in?     Routed to Dr. Sesar Burns

## 2023-03-05 ENCOUNTER — OFFICE VISIT (OUTPATIENT)
Dept: FAMILY MEDICINE CLINIC | Facility: CLINIC | Age: 68
End: 2023-03-05
Payer: MEDICARE

## 2023-03-05 VITALS
TEMPERATURE: 99 F | BODY MASS INDEX: 28.35 KG/M2 | SYSTOLIC BLOOD PRESSURE: 130 MMHG | HEART RATE: 93 BPM | RESPIRATION RATE: 18 BRPM | WEIGHT: 160 LBS | DIASTOLIC BLOOD PRESSURE: 80 MMHG | OXYGEN SATURATION: 98 % | HEIGHT: 63 IN

## 2023-03-05 DIAGNOSIS — J02.9 SORE THROAT: ICD-10-CM

## 2023-03-05 DIAGNOSIS — Z20.822 EXPOSURE TO CONFIRMED CASE OF COVID-19: Primary | ICD-10-CM

## 2023-03-05 LAB
CONTROL LINE PRESENT WITH A CLEAR BACKGROUND (YES/NO): YES YES/NO
STREP GRP A CUL-SCR: NEGATIVE

## 2023-03-05 NOTE — PATIENT INSTRUCTIONS
General comfort measures:  Get rest!  Hydrate! (cold or hot based on comfort). Drink lots of water or other non dehydrating liquids to help with illness. Salty foods, soups and tea can help with throat pain. Hand washing-use hand  or wash hands frequently, cover your cough or sneeze, do not share towels or drinks with others. Salt water gargles (1 tsp. Salt in 6 oz lukewarm water): gargle for 2 minutes, repeat every 15 minutes as needed to help decrease swelling and relieve pain. Use humidified air, steamy showers/baths and use vaporizer in sleeping quarters to keep secretions thin. Avoid smoking. Symptom management:    Nasal congestion/Post-nasal-drip: Saline nasal spray to nostrils to help remove drainage or an antihistamine to help dry up drainage. Sinus congestion/Post-nasal-drip: OTC Nasacort or Flonase (steroid nasal spray) nightly for 2 weeks. May take Sudafed (D) or Sudafed-PE, if not contraindicated (do not take if you have HTN). Pain/discomfort:  May use Tylenol or Ibuprofen, if not contraindicated. Cough: May take DM-dextromethorophan over the counter (long lasting). Ex: Delsym  Chest congestion:  May take guaifenesin with a lot of water. Ex:  Plain Mucinex. Sore throat:  Cepacol lozenges or Chloroseptic throat spray (active ingredient Benzocaine). Follow up with your PCP in 1-2 weeks if not better. Follow up in a few days if worsening symptoms. Seek immediate care if inability to swallow or breathe.

## 2023-03-06 LAB — SARS-COV-2 RNA RESP QL NAA+PROBE: DETECTED

## 2023-05-08 ENCOUNTER — OFFICE VISIT (OUTPATIENT)
Dept: FAMILY MEDICINE CLINIC | Facility: CLINIC | Age: 68
End: 2023-05-08
Payer: MEDICARE

## 2023-05-08 VITALS
DIASTOLIC BLOOD PRESSURE: 90 MMHG | WEIGHT: 156 LBS | RESPIRATION RATE: 16 BRPM | HEIGHT: 63 IN | SYSTOLIC BLOOD PRESSURE: 170 MMHG | BODY MASS INDEX: 27.64 KG/M2 | HEART RATE: 80 BPM

## 2023-05-08 DIAGNOSIS — N02.8 IGA NEPHROPATHY: ICD-10-CM

## 2023-05-08 DIAGNOSIS — Z12.31 VISIT FOR SCREENING MAMMOGRAM: ICD-10-CM

## 2023-05-08 DIAGNOSIS — I10 ESSENTIAL HYPERTENSION: Primary | ICD-10-CM

## 2023-05-08 DIAGNOSIS — E78.5 DYSLIPIDEMIA: ICD-10-CM

## 2023-05-08 PROCEDURE — 99214 OFFICE O/P EST MOD 30 MIN: CPT | Performed by: FAMILY MEDICINE

## 2023-05-08 RX ORDER — LISINOPRIL 20 MG/1
20 TABLET ORAL 2 TIMES DAILY
Qty: 180 TABLET | Refills: 3 | Status: SHIPPED | OUTPATIENT
Start: 2023-05-08

## 2023-05-10 ENCOUNTER — HOSPITAL ENCOUNTER (OUTPATIENT)
Dept: MAMMOGRAPHY | Age: 68
Discharge: HOME | End: 2023-05-10
Attending: FAMILY MEDICINE
Payer: MEDICARE

## 2023-05-10 DIAGNOSIS — Z12.31 VISIT FOR SCREENING MAMMOGRAM: ICD-10-CM

## 2023-05-10 PROCEDURE — 77063 BREAST TOMOSYNTHESIS BI: CPT | Performed by: FAMILY MEDICINE

## 2023-05-10 PROCEDURE — 77067 SCR MAMMO BI INCL CAD: CPT | Performed by: FAMILY MEDICINE

## 2023-05-11 ENCOUNTER — TELEPHONE (OUTPATIENT)
Dept: FAMILY MEDICINE CLINIC | Facility: CLINIC | Age: 68
End: 2023-05-11

## 2023-05-11 NOTE — TELEPHONE ENCOUNTER
Pt was seen 5/8. BP was elevated. Has been monitoring BP at home. Sent readings via [address] BP is elevated this evening (126/111). Pt has been taking her BP multiple times a day. She is wondering if this could be causing her stress and increasing her numbers. Pt advised to take BP once a day at a calm time of the day. Send readings in 1 week unless they remain high then pt to notify office sooner. She verbalized understanding and agrees with plan. She reports she is asymptomatic at this time.      FYI to Dr. Tariq Chapman

## 2023-05-12 ENCOUNTER — LAB ENCOUNTER (OUTPATIENT)
Dept: LAB | Age: 68
End: 2023-05-12
Attending: FAMILY MEDICINE
Payer: MEDICARE

## 2023-05-12 DIAGNOSIS — E78.5 DYSLIPIDEMIA: ICD-10-CM

## 2023-05-12 DIAGNOSIS — N02.8 IGA NEPHROPATHY: ICD-10-CM

## 2023-05-12 LAB
ALBUMIN SERPL-MCNC: 3.8 G/DL (ref 3.4–5)
ALBUMIN/GLOB SERPL: 1 {RATIO} (ref 1–2)
ALP LIVER SERPL-CCNC: 115 U/L
ALT SERPL-CCNC: 38 U/L
ANION GAP SERPL CALC-SCNC: 2 MMOL/L (ref 0–18)
AST SERPL-CCNC: 32 U/L (ref 15–37)
BILIRUB SERPL-MCNC: 0.5 MG/DL (ref 0.1–2)
BUN BLD-MCNC: 26 MG/DL (ref 7–18)
CALCIUM BLD-MCNC: 10.3 MG/DL (ref 8.5–10.1)
CHLORIDE SERPL-SCNC: 104 MMOL/L (ref 98–112)
CHOLEST SERPL-MCNC: 196 MG/DL (ref ?–200)
CO2 SERPL-SCNC: 29 MMOL/L (ref 21–32)
CREAT BLD-MCNC: 1.08 MG/DL
CREAT UR-SCNC: 32.2 MG/DL
FASTING PATIENT LIPID ANSWER: YES
FASTING STATUS PATIENT QL REPORTED: YES
GFR SERPLBLD BASED ON 1.73 SQ M-ARVRAT: 57 ML/MIN/1.73M2 (ref 60–?)
GLOBULIN PLAS-MCNC: 4 G/DL (ref 2.8–4.4)
GLUCOSE BLD-MCNC: 106 MG/DL (ref 70–99)
HDLC SERPL-MCNC: 47 MG/DL (ref 40–59)
LDLC SERPL CALC-MCNC: 126 MG/DL (ref ?–100)
MICROALBUMIN UR-MCNC: <0.5 MG/DL
NONHDLC SERPL-MCNC: 149 MG/DL (ref ?–130)
OSMOLALITY SERPL CALC.SUM OF ELEC: 285 MOSM/KG (ref 275–295)
POTASSIUM SERPL-SCNC: 4.2 MMOL/L (ref 3.5–5.1)
PROT SERPL-MCNC: 7.8 G/DL (ref 6.4–8.2)
SODIUM SERPL-SCNC: 135 MMOL/L (ref 136–145)
TRIGL SERPL-MCNC: 130 MG/DL (ref 30–149)
VLDLC SERPL CALC-MCNC: 23 MG/DL (ref 0–30)

## 2023-05-12 PROCEDURE — 80053 COMPREHEN METABOLIC PANEL: CPT

## 2023-05-12 PROCEDURE — 80061 LIPID PANEL: CPT

## 2023-05-12 PROCEDURE — 36415 COLL VENOUS BLD VENIPUNCTURE: CPT

## 2023-05-12 PROCEDURE — 82043 UR ALBUMIN QUANTITATIVE: CPT

## 2023-05-12 PROCEDURE — 82570 ASSAY OF URINE CREATININE: CPT

## 2023-05-16 ENCOUNTER — PATIENT MESSAGE (OUTPATIENT)
Dept: FAMILY MEDICINE CLINIC | Facility: CLINIC | Age: 68
End: 2023-05-16

## 2023-05-16 NOTE — TELEPHONE ENCOUNTER
From: Simon Gonzalez  To: Keisha Solis MD  Sent: 5/16/2023 2:35 PM CDT  Subject: Follow up    Yes, I do take a calcium supplement. My first normal bp reading today was 95/78. Am back to walking 2+ miles/day now and trying to eat a healthier diet. Yesterday I had a dTap vaccine through [employer]. I'll drop off the confirmation of that along with confirmation of the pneumonia vaccine once I have had that administered.      Vivien Emmanuel

## 2023-11-17 ENCOUNTER — TELEPHONE (OUTPATIENT)
Dept: FAMILY MEDICINE CLINIC | Facility: CLINIC | Age: 68
End: 2023-11-17

## 2023-11-17 DIAGNOSIS — E78.5 DYSLIPIDEMIA: ICD-10-CM

## 2023-11-17 DIAGNOSIS — N02.B9 IGA NEPHROPATHY: Primary | ICD-10-CM

## 2023-11-17 NOTE — TELEPHONE ENCOUNTER
Please enter lab orders for the patient's upcoming physical appointment. Physical scheduled: Your appointments       Date & Time Appointment Department [facility])    Feb 05, 2024  9:00 AM CST Medicare Annual Well Visit with Bronwyn Johnson MD [address], [address], [place] ([address])              [address], [address], [place] [address] [place] [address] [phone]           [facility])     The patient has been notified to complete fasting labs prior to their physical appointment.

## 2024-02-05 ENCOUNTER — OFFICE VISIT (OUTPATIENT)
Dept: FAMILY MEDICINE CLINIC | Facility: CLINIC | Age: 69
End: 2024-02-05
Payer: MEDICARE

## 2024-02-05 ENCOUNTER — PATIENT MESSAGE (OUTPATIENT)
Dept: FAMILY MEDICINE CLINIC | Facility: CLINIC | Age: 69
End: 2024-02-05

## 2024-02-05 VITALS
HEART RATE: 96 BPM | RESPIRATION RATE: 16 BRPM | DIASTOLIC BLOOD PRESSURE: 88 MMHG | HEIGHT: 63 IN | BODY MASS INDEX: 26.69 KG/M2 | TEMPERATURE: 98 F | WEIGHT: 150.63 LBS | SYSTOLIC BLOOD PRESSURE: 160 MMHG

## 2024-02-05 DIAGNOSIS — Z12.11 COLON CANCER SCREENING: ICD-10-CM

## 2024-02-05 DIAGNOSIS — Z12.31 VISIT FOR SCREENING MAMMOGRAM: ICD-10-CM

## 2024-02-05 DIAGNOSIS — Z00.00 ENCOUNTER FOR ANNUAL HEALTH EXAMINATION: Primary | ICD-10-CM

## 2024-02-05 DIAGNOSIS — I10 ESSENTIAL HYPERTENSION: ICD-10-CM

## 2024-02-05 DIAGNOSIS — Z78.0 POSTMENOPAUSAL ESTROGEN DEFICIENCY: ICD-10-CM

## 2024-02-05 DIAGNOSIS — N02.B9 IGA NEPHROPATHY: ICD-10-CM

## 2024-02-05 PROBLEM — S42.402A CLOSED FRACTURE DISLOCATION OF LEFT ELBOW, INITIAL ENCOUNTER: Status: RESOLVED | Noted: 2021-06-10 | Resolved: 2024-02-05

## 2024-02-05 PROBLEM — S42.402A CLOSED FRACTURE DISLOCATION OF LEFT ELBOW JOINT: Status: RESOLVED | Noted: 2021-06-10 | Resolved: 2024-02-05

## 2024-02-05 PROBLEM — D12.0 BENIGN NEOPLASM OF CECUM: Status: RESOLVED | Noted: 2019-09-05 | Resolved: 2024-02-05

## 2024-02-05 PROBLEM — D12.2 BENIGN NEOPLASM OF ASCENDING COLON: Status: RESOLVED | Noted: 2019-09-05 | Resolved: 2024-02-05

## 2024-02-05 PROCEDURE — G0438 PPPS, INITIAL VISIT: HCPCS | Performed by: FAMILY MEDICINE

## 2024-02-05 RX ORDER — LISINOPRIL 20 MG/1
20 TABLET ORAL 2 TIMES DAILY
Qty: 180 TABLET | Refills: 3 | Status: SHIPPED | OUTPATIENT
Start: 2024-02-05

## 2024-02-05 NOTE — TELEPHONE ENCOUNTER
From: Parvin Payton  To: Leticia Gray  Sent: 2/5/2024 4:08 PM CST  Subject: Lab work    Hi Dr. Gray,  Central scheduling does not have any lab work orders. Tried to schedule an appointment and was unable to.    Parvin Payton

## 2024-02-05 NOTE — PROGRESS NOTES
Subjective:   Parvin Payton is a 68 year old female who presents for a Medicare Subsequent Annual Wellness visit (Pt already had Initial Annual Wellness) and scheduled follow up of multiple significant but stable problems.     HTN:  Treated with lisinopril 20mg BID    History of IgA nephropathy.  She has seen Dr. Meeks.  Last visit with him was in 2019.  She is on lisinopril 20 mg twice daily for blood pressure control, as well as long-term renal protection    Christi:  5/2023  DEXA:  8/2021  Colon:  9/2019 - repeat in 5 years.  Dr. Diana  Tdap:  5/2023  Shingrix:  4/2018, 7/2018  PCV20:  5/2023    Severe reaction to covid reaction.      2 children, 1 granddaughter      History/Other:   Fall Risk Assessment:   She has been screened for Falls and is low risk.      Cognitive Assessment:   She had a completely normal cognitive assessment - see flowsheet entries       Functional Ability/Status:   Parvin Payton has a completely normal functional assessment. See flowsheet for details.      Depression Screening (PHQ-2/PHQ-9): PHQ-2 SCORE: 0  , done 2/5/2024   If you checked off any problems, how difficult have these problems made it for you to do your work, take care of things at home, or get along with other people?: Not difficult at all         Advanced Directives:   She does have a Living Will but we do NOT have it on file in Epic.    She does have a POA but we do NOT have it on file in Epic.        Patient Active Problem List   Diagnosis    Pure hyperglyceridemia    Vitamin D deficiency    Nephritis and nephropathy, not specified as acute or chronic, with unspecified pathological lesion in kidney    Hammonds's disease    Essential hypertension    Microscopic hematuria     Allergies:  She has No Known Allergies.    Current Medications:  Outpatient Medications Marked as Taking for the 2/5/24 encounter (Office Visit) with Leticia Gray MD   Medication Sig    lisinopril 20 MG Oral Tab Take 1 tablet (20 mg  total) by mouth 2 (two) times daily.       Medical History:  She  has a past medical history of Essential hypertension, HIGH BLOOD PRESSURE, High cholesterol, Hyperlipidemia, Hypertension, Lipid screening (2011), OTHER DISEASES, Pap smear for cervical cancer screening (13), PONV (postoperative nausea and vomiting), Spontaneous ecchymoses, and Wears glasses.  Surgical History:  She  has a past surgical history that includes other surgical history (2004); tubal ligation (); dilation/curettage,diagnostic; colonoscopy; colonoscopy (); d & c; and  (90).   Family History:  Her family history includes Cancer in her mother; Dementia in her father and paternal grandmother; Heart Attack in her maternal grandfather, maternal grandmother, and paternal grandfather; Hypertension in her father; Stroke in her father.  Social History:  She  reports that she has never smoked. She has never used smokeless tobacco. She reports current alcohol use. She reports that she does not use drugs.    Tobacco:  She has never smoked tobacco.    CAGE Alcohol Screen:   CAGE screening score of 0 on 2024, showing low risk of alcohol abuse.      Patient Care Team:  Leticia Gray MD as PCP - General (Family Practice)  Sami Meeks MD (NEPHROLOGY)  Shadia Garcia, PT as Physical Therapist  Ashanti Nascimento MD (SURGERY, ORTHOPEDIC)  Jd Poe, PT as Physical Therapist  Cecilia Castillo OT as Occupational Therapist (Occupational Therapist)    Review of Systems  GEN:  No fever or fatigue  HEENT:  No vision or hearing concerns.  No dental problems.  HEART:  No chest pain or palpitations  LUNG:  No SOB, cough or wheeze  GI:  No abdominal pain.  No N/V/D/C  :  No dysuria  EXT:  No joint pain.  No LE swelling  PSYCH:  No mood concerns or anxiety    Objective:   Physical Exam  GEN:  Alert, NAD  HEENT:  PERRL, no nasal discharge, O/P normal without erythema or exudate, dentition good  NECK:  No LAD, no  thyromegaly  HEART:  RRR, no MRGs  LUNG:  CTA bilaterally, no RRWs  AB:  Soft, nontender, nondistended.  No palpable masses  EXT: no pedal edema  PSYCH:  Mood appropriate    /88   Pulse 96   Temp 97.7 °F (36.5 °C)   Resp 16   Ht 5' 3\" (1.6 m)   Wt 150 lb 9.6 oz (68.3 kg)   LMP 12/23/2008   BMI 26.68 kg/m²  Estimated body mass index is 26.68 kg/m² as calculated from the following:    Height as of this encounter: 5' 3\" (1.6 m).    Weight as of this encounter: 150 lb 9.6 oz (68.3 kg).    Medicare Hearing Assessment:   Hearing Screening    Screening Method: Whisper Test  Whisper Test Result: Pass               Visual Acuity:   Right Eye Visual Acuity: Corrected Right Eye Chart Acuity: 20/25   Left Eye Visual Acuity: Corrected Left Eye Chart Acuity: 20/30   Both Eyes Visual Acuity: Corrected Both Eyes Chart Acuity: 20/25   Able To Tolerate Visual Acuity: Yes        Assessment & Plan:   Parvin Payton is a 68 year old female who presents for a Medicare Assessment.     1. Encounter for annual health examination (Primary)  2. Visit for screening mammogram  -     [facility] ADRIEL 2D+3D SCREENING BILAT (CPT=77067/55203); Future; Expected date: 02/05/2024  3. Postmenopausal estrogen deficiency  -     XR DEXA BONE DENSITOMETRY (CPT=77080); Future; Expected date: 02/05/2024  4. Colon cancer screening  -     EVALUATE & TREAT, GASTRO (INTERNAL)  5. Essential hypertension  6. IgA nephropathy  -     Lisinopril; Take 1 tablet (20 mg total) by mouth 2 (two) times daily.  Dispense: 180 tablet; Refill: 3    Completed labs  BP high in office.  Recommend checking at home and sending readings to me.     The patient indicates understanding of these issues and agrees to the plan.  Reinforced healthy diet, lifestyle, and exercise.      Return in 1 year (on 2/5/2025).     Leticia Gray MD    Supplementary Documentation:   General Health:  In the past six months, have you lost more than 10 pounds without trying?: 2 - No  Has your  appetite been poor?: No  Type of Diet: Balanced  How does the patient maintain a good energy level?: Daily Walks  How would you describe your daily physical activity?: Moderate  How would you describe your current health state?: Good  How do you maintain positive mental well-being?: Social Interaction;Puzzles;Visiting Friends;Visiting Family  On a scale of 0 to 10, with 0 being no pain and 10 being severe pain, what is your pain level?: 0 - (None)  In the past six months, have you experienced urine leakage?: 0-No  At any time do you feel concerned for the safety/well-being of yourself and/or your children, in your home or elsewhere?: No  Have you had any immunizations at another office such as Influenza, Hepatitis B, Tetanus, or Pneumococcal?: Yes       Parvin Payton's SCREENING SCHEDULE   Tests on this list are recommended by your physician but may not be covered, or covered at this frequency, by your insurer.   Please check with your insurance carrier before scheduling to verify coverage.   PREVENTATIVE SERVICES FREQUENCY &  COVERAGE DETAILS LAST COMPLETION DATE   Diabetes Screening    Fasting Blood Sugar /  Glucose    One screening every 12 months if never tested or if previously tested but not diagnosed with pre-diabetes   One screening every 6 months if diagnosed with pre-diabetes Lab Results   Component Value Date     (H) 05/12/2023        Cardiovascular Disease Screening    Lipid Panel  Cholesterol  Lipoprotein (HDL)  Triglycerides Covered every 5 years for all Medicare beneficiaries without apparent signs or symptoms of cardiovascular disease Lab Results   Component Value Date    CHOLEST 196 05/12/2023    HDL 47 05/12/2023     (H) 05/12/2023    TRIG 130 05/12/2023         Electrocardiogram (EKG)   Covered if needed at Welcome to Medicare, and non-screening if indicated for medical reasons 06/11/2021      Ultrasound Screening for Abdominal Aortic Aneurysm (AAA) Covered once in a lifetime for  one of the following risk factors    Men who are 65-75 years old and have ever smoked    Anyone with a family history -     Colorectal Cancer Screening  Covered for ages 50-85; only need ONE of the following:    Colonoscopy   Covered every 10 years    Covered every 2 years if patient is at high risk or previous colonoscopy was abnormal 09/05/2019    Health Maintenance   Topic Date Due    Colorectal Cancer Screening  09/05/2024       Flexible Sigmoidoscopy   Covered every 4 years -    Fecal Occult Blood Test Covered annually -   Bone Density Screening    Bone density screening    Covered every 2 years after age 65 if diagnosed with risk of osteoporosis or estrogen deficiency.    Covered yearly for long-term glucocorticoid medication use (Steroids) Last Dexa Scan:    XR DEXA BONE DENSITOMETRY (CPT=77080) 08/11/2021      No recommendations at this time   Pap and Pelvic    Pap   Covered every 2 years for women at normal risk; Annually if at high risk 11/09/2020  No recommendations at this time    Chlamydia Annually if high risk -  No recommendations at this time   Screening Mammogram    Mammogram     Recommend annually for all female patients aged 40 and older    One baseline mammogram covered for patients aged 35-39 05/10/2023    Health Maintenance   Topic Date Due    Mammogram  05/10/2024       Immunizations    Influenza Covered once per flu season  Please get every year 10/13/2023  No recommendations at this time    Pneumococcal Each vaccine (Isjeyck33 & [id number]) covered once after 65 Prevnar 13: -    [id number]: -     No recommendations at this time    Hepatitis B One screening covered for patients with certain risk factors   11/29/2006  No recommendations at this time    Tetanus Toxoid Not covered by Medicare Part B unless medically necessary (cut with metal); may be covered with your pharmacy prescription benefits -    Tetanus, Diptheria and Pertusis TD and TDaP Not covered by Medicare Part B -  No  recommendations at this time    Zoster Not covered by Medicare Part B; may be covered with your pharmacy  prescription benefits 12/04/2013  No recommendations at this time     Annual Monitoring of Persistent Medications (ACE/ARB, digoxin diuretics, anticonvulsants)    Potassium Annually Lab Results   Component Value Date    K 4.2 05/12/2023         Creatinine   Annually Lab Results   Component Value Date    CREATSERUM 1.08 (H) 05/12/2023         BUN Annually Lab Results   Component Value Date    BUN 26 (H) 05/12/2023       Drug Serum Conc Annually No results found for: \"DIGOXIN\", \"DIG\", \"VALP\"

## 2024-02-05 NOTE — PATIENT INSTRUCTIONS
Parvin Payton's SCREENING SCHEDULE   Tests on this list are recommended by your physician but may not be covered, or covered at this frequency, by your insurer.   Please check with your insurance carrier before scheduling to verify coverage.   PREVENTATIVE SERVICES FREQUENCY &  COVERAGE DETAILS LAST COMPLETION DATE   Diabetes Screening    Fasting Blood Sugar /  Glucose    One screening every 12 months if never tested or if previously tested but not diagnosed with pre-diabetes   One screening every 6 months if diagnosed with pre-diabetes Lab Results   Component Value Date     (H) 05/12/2023        Cardiovascular Disease Screening    Lipid Panel  Cholesterol  Lipoprotein (HDL)  Triglycerides Covered every 5 years for all Medicare beneficiaries without apparent signs or symptoms of cardiovascular disease Lab Results   Component Value Date    CHOLEST 196 05/12/2023    HDL 47 05/12/2023     (H) 05/12/2023    TRIG 130 05/12/2023         Electrocardiogram (EKG)   Covered if needed at Welcome to Medicare, and non-screening if indicated for medical reasons 06/11/2021      Ultrasound Screening for Abdominal Aortic Aneurysm (AAA) Covered once in a lifetime for one of the following risk factors   • Men who are 65-75 years old and have ever smoked   • Anyone with a family history -     Colorectal Cancer Screening  Covered for ages 50-85; only need ONE of the following:    Colonoscopy   Covered every 10 years    Covered every 2 years if patient is at high risk or previous colonoscopy was abnormal 09/05/2019    Health Maintenance   Topic Date Due   • Colorectal Cancer Screening  09/05/2024       Flexible Sigmoidoscopy   Covered every 4 years -    Fecal Occult Blood Test Covered annually -   Bone Density Screening    Bone density screening    Covered every 2 years after age 65 if diagnosed with risk of osteoporosis or estrogen deficiency.    Covered yearly for long-term glucocorticoid medication use (Steroids) Last  Dexa Scan:    XR DEXA BONE DENSITOMETRY (CPT=77080) 08/11/2021      No recommendations at this time   Pap and Pelvic    Pap   Covered every 2 years for women at normal risk; Annually if at high risk 11/09/2020  No recommendations at this time    Chlamydia Annually if high risk -  No recommendations at this time   Screening Mammogram    Mammogram     Recommend annually for all female patients aged 40 and older    One baseline mammogram covered for patients aged 35-39 05/10/2023    Health Maintenance   Topic Date Due   • Mammogram  05/10/2024       Immunizations    Influenza Covered once per flu season  Please get every year 10/13/2023  No recommendations at this time    Pneumococcal Each vaccine (Ssolxxy86 & Mnalboqql91) covered once after 65 Prevnar 13: -    [id number]: -     No recommendations at this time    Hepatitis B One screening covered for patients with certain risk factors   11/29/2006  No recommendations at this time    Tetanus Toxoid Not covered by Medicare Part B unless medically necessary (cut with metal); may be covered with your pharmacy prescription benefits -    Tetanus, Diptheria and Pertusis TD and TDaP Not covered by Medicare Part B -  No recommendations at this time    Zoster Not covered by Medicare Part B; may be covered with your pharmacy  prescription benefits 12/04/2013  No recommendations at this time     Annual Monitoring of Persistent Medications (ACE/ARB, digoxin diuretics, anticonvulsants)    Potassium Annually Lab Results   Component Value Date    K 4.2 05/12/2023         Creatinine   Annually Lab Results   Component Value Date    CREATSERUM 1.08 (H) 05/12/2023         BUN Annually Lab Results   Component Value Date    BUN 26 (H) 05/12/2023       Drug Serum Conc Annually No results found for: \"DIGOXIN\", \"DIG\", \"VALP\"

## 2024-02-07 ENCOUNTER — LAB ENCOUNTER (OUTPATIENT)
Dept: LAB | Age: 69
End: 2024-02-07
Attending: FAMILY MEDICINE
Payer: MEDICARE

## 2024-02-07 DIAGNOSIS — E78.5 DYSLIPIDEMIA: ICD-10-CM

## 2024-02-07 DIAGNOSIS — N02.B9 IGA NEPHROPATHY: ICD-10-CM

## 2024-02-07 LAB
ALBUMIN SERPL-MCNC: 3.8 G/DL (ref 3.4–5)
ALBUMIN/GLOB SERPL: 0.9 {RATIO} (ref 1–2)
ALP LIVER SERPL-CCNC: 131 U/L
ALT SERPL-CCNC: 39 U/L
ANION GAP SERPL CALC-SCNC: 6 MMOL/L (ref 0–18)
AST SERPL-CCNC: 23 U/L (ref 15–37)
BILIRUB SERPL-MCNC: 0.5 MG/DL (ref 0.1–2)
BUN BLD-MCNC: 25 MG/DL (ref 9–23)
CALCIUM BLD-MCNC: 9.9 MG/DL (ref 8.5–10.1)
CHLORIDE SERPL-SCNC: 103 MMOL/L (ref 98–112)
CHOLEST SERPL-MCNC: 216 MG/DL (ref ?–200)
CO2 SERPL-SCNC: 29 MMOL/L (ref 21–32)
CREAT BLD-MCNC: 1.16 MG/DL
CREAT UR-SCNC: 24.2 MG/DL
EGFRCR SERPLBLD CKD-EPI 2021: 52 ML/MIN/1.73M2 (ref 60–?)
FASTING PATIENT LIPID ANSWER: YES
FASTING STATUS PATIENT QL REPORTED: YES
GLOBULIN PLAS-MCNC: 4.3 G/DL (ref 2.8–4.4)
GLUCOSE BLD-MCNC: 112 MG/DL (ref 70–99)
HDLC SERPL-MCNC: 48 MG/DL (ref 40–59)
LDLC SERPL CALC-MCNC: 150 MG/DL (ref ?–100)
MICROALBUMIN UR-MCNC: 0.73 MG/DL
MICROALBUMIN/CREAT 24H UR-RTO: 30.2 UG/MG (ref ?–30)
NONHDLC SERPL-MCNC: 168 MG/DL (ref ?–130)
OSMOLALITY SERPL CALC.SUM OF ELEC: 291 MOSM/KG (ref 275–295)
POTASSIUM SERPL-SCNC: 4.4 MMOL/L (ref 3.5–5.1)
PROT SERPL-MCNC: 8.1 G/DL (ref 6.4–8.2)
SODIUM SERPL-SCNC: 138 MMOL/L (ref 136–145)
TRIGL SERPL-MCNC: 103 MG/DL (ref 30–149)
VLDLC SERPL CALC-MCNC: 19 MG/DL (ref 0–30)

## 2024-02-07 PROCEDURE — 80053 COMPREHEN METABOLIC PANEL: CPT

## 2024-02-07 PROCEDURE — 82043 UR ALBUMIN QUANTITATIVE: CPT

## 2024-02-07 PROCEDURE — 82570 ASSAY OF URINE CREATININE: CPT

## 2024-02-07 PROCEDURE — 80061 LIPID PANEL: CPT

## 2024-02-07 PROCEDURE — 36415 COLL VENOUS BLD VENIPUNCTURE: CPT

## 2024-02-09 ENCOUNTER — PATIENT MESSAGE (OUTPATIENT)
Dept: FAMILY MEDICINE CLINIC | Facility: CLINIC | Age: 69
End: 2024-02-09

## 2024-02-09 NOTE — TELEPHONE ENCOUNTER
From: Parvin Payton  To: Leticia Gray  Sent: 2/9/2024 10:12 AM CST  Subject: Blood pressure readings at home     Good morning Dr. Gray,    Here are my readings these last few days using the [employer] blood pressure reader. Am taking these readings once a day about 2-3 hours after taking my medication in the morning.    2/7 125/90  2/8 103/78  2/9. 107/81    Parvin Payton

## 2024-05-09 ENCOUNTER — HOSPITAL ENCOUNTER (OUTPATIENT)
Age: 69
Discharge: HOME | End: 2024-05-09

## 2024-05-09 ENCOUNTER — APPOINTMENT (OUTPATIENT)
Dept: GENERAL RADIOLOGY | Age: 69
End: 2024-05-09
Attending: NURSE PRACTITIONER

## 2024-05-09 VITALS
HEIGHT: 63 IN | RESPIRATION RATE: 18 BRPM | OXYGEN SATURATION: 100 % | HEART RATE: 100 BPM | DIASTOLIC BLOOD PRESSURE: 93 MMHG | TEMPERATURE: 98 F | WEIGHT: 150 LBS | SYSTOLIC BLOOD PRESSURE: 187 MMHG | BODY MASS INDEX: 26.58 KG/M2

## 2024-05-09 DIAGNOSIS — S90.31XA CONTUSION OF RIGHT FOOT, INITIAL ENCOUNTER: Primary | ICD-10-CM

## 2024-05-09 PROCEDURE — 99214 OFFICE O/P EST MOD 30 MIN: CPT

## 2024-05-09 PROCEDURE — 99213 OFFICE O/P EST LOW 20 MIN: CPT

## 2024-05-09 PROCEDURE — 73630 X-RAY EXAM OF FOOT: CPT | Performed by: NURSE PRACTITIONER

## 2024-05-09 NOTE — ED PROVIDER NOTES
Patient Seen in: [facility]      History     Chief Complaint   Patient presents with    Leg or Foot Injury     Stated Complaint: foot injury    Subjective:   HPI  68-year-old female with hypertension, hyperlipidemia, and Buerger's disease presents with slight bruising and swelling to the dorsal aspect of her right foot after she dropped an umbrella stand on her foot this morning.  She is ambulatory without assistance.    Objective:   Past Medical History:    Essential hypertension    HIGH BLOOD PRESSURE    High cholesterol    Hyperlipidemia    Hypertension    Lipid screening    Done    OTHER DISEASES    bergers disease    Pap smear for cervical cancer screening    endometrial cell found neg HPV    PONV (postoperative nausea and vomiting)    Spontaneous ecchymoses    Wears glasses              Past Surgical History:   Procedure Laterality Date    Colonoscopy      Colonoscopy      D & c      Dilation/curettage,diagnostic        90    Other surgical history  2004    renal surgery    Tubal ligation      Done @[facility]                 Social History     Socioeconomic History    Marital status:    Occupational History    Occupation: homemaker   Tobacco Use    Smoking status: Never    Smokeless tobacco: Never   Vaping Use    Vaping status: Never Used   Substance and Sexual Activity    Alcohol use: Yes     Alcohol/week: 0.0 standard drinks of alcohol     Comment: Sometimes    Drug use: No    Sexual activity: Never   Other Topics Concern    Caffeine Concern No    Sleep Concern Yes     Comment: wakes up once nightly    Stress Concern No    Weight Concern Yes    Special Diet No    Exercise Yes    Seat Belt Yes    Self-Exams Yes     Comment: self breast exam monthly     Social Determinants of Health      [facility], [facility]    Social Connections    Received from [facility], [facility]  Center    Housing Stability              Review of Systems   All other systems reviewed and are negative.      Positive for stated complaint: foot injury  Other systems are as noted in HPI.  Constitutional and vital signs reviewed.      All other systems reviewed and negative except as noted above.    Physical Exam     ED Triage Vitals [05/09/24 1146]   BP (!) 187/93   Pulse 100   Resp 18   Temp 98.1 °F (36.7 °C)   Temp src Temporal   SpO2 100 %   O2 Device None (Room air)       Current Vitals:   Vital Signs  BP: (!) 187/93  Pulse: 100  Resp: 18  Temp: 98.1 °F (36.7 °C)  Temp src: Temporal    Oxygen Therapy  SpO2: 100 %  O2 Device: None (Room air)            Physical Exam  Vitals and nursing note reviewed.   Constitutional:       General: She is not in acute distress.     Appearance: She is well-developed. She is not ill-appearing or toxic-appearing.   Cardiovascular:      Rate and Rhythm: Normal rate.   Pulmonary:      Effort: Pulmonary effort is normal.   Musculoskeletal:      Comments: Superficial abrasion across the mid dorsal aspect of the foot with mild swelling and minimal bruising.  DP 2+.  Full range of motion of ankle.  Slight tenderness over the mid second through fourth metatarsals.   Skin:     General: Skin is warm and dry.   Neurological:      Mental Status: She is alert and oriented to person, place, and time.           ED Course   Labs Reviewed - No data to display        XR FOOT, COMPLETE (MIN 3 VIEWS), RIGHT (CPT=73630)    Result Date: 5/9/2024  PROCEDURE:  XR FOOT, COMPLETE (MIN 3 VIEWS), RIGHT (CPT=73630)  TECHNIQUE:  AP, oblique, and lateral views were obtained.  COMPARISON:  [facility] , XR, XR ANKLE (MIN 3 VIEWS), RIGHT (CPT=73610), 6/10/2021, 2:31 PM.  INDICATIONS:  foot injury  PATIENT STATED HISTORY: (As transcribed by Technologist)  Patient states injury to right foot after dropping umbrella base on it this morning, complains of dorsal pain, bruising and swelling.    FINDINGS:  No acute  displaced osseous fracture is identified.  No significant soft tissue swelling is evident.  Mildly prominent plantar calcaneal spur.             CONCLUSION:  See above.   LOCATION:  [facility]   Dictated by (CST): Stromberg, LeRoy, MD on 5/09/2024 at 1:04 PM     Finalized by (FEDERICO): Stromberg, LeRoy, MD on 5/09/2024 at 1:05 PM               [facility]     Medical Decision Making  68-year-old female with hypertension, hyperlipidemia, and Buerger's disease presents with slight bruising and swelling to the dorsal aspect of her right foot after she dropped an umbrella stand on her foot this morning.  She is ambulatory without assistance.    Clinical impression: Contusion of right foot    Differential diagnosis: Foot contusion, foot fracture, foot sprain, compartment syndrome    Foot x-ray shows No acute displaced osseous fracture is identified.  No significant soft tissue swelling is evident.  Mildly prominent plantar calcaneal spur.   Foot compartment is soft with a slight abrasion and bruising.  Patient is ambulatory without assistance.  She will be discharged with RICE and Tylenol/Motrin as needed for pain.            Problems Addressed:  Contusion of right foot, initial encounter: acute illness or injury    Amount and/or Complexity of Data Reviewed  Radiology: ordered and independent interpretation performed.     Details: No fracture        Disposition and Plan     Clinical Impression:  1. Contusion of right foot, initial encounter         Disposition:  Discharge  5/9/2024  1:10 pm    Follow-up:  No follow-up provider specified.        Medications Prescribed:  Discharge Medication List as of 5/9/2024  1:28 PM

## 2024-05-09 NOTE — ED INITIAL ASSESSMENT (HPI)
Pt. States she dropped the base of an umbrella stand on her right foot this morning and the base broke. The top of her right foot is bruised and swollen. She can walk on it and move her foot but wanted to get it looked at.

## 2024-05-14 ENCOUNTER — HOSPITAL ENCOUNTER (OUTPATIENT)
Dept: BONE DENSITY | Age: 69
Discharge: HOME | End: 2024-05-14
Attending: FAMILY MEDICINE

## 2024-05-14 ENCOUNTER — HOSPITAL ENCOUNTER (OUTPATIENT)
Dept: MAMMOGRAPHY | Age: 69
Discharge: HOME | End: 2024-05-14
Attending: FAMILY MEDICINE

## 2024-05-14 DIAGNOSIS — Z78.0 POSTMENOPAUSAL ESTROGEN DEFICIENCY: ICD-10-CM

## 2024-05-14 DIAGNOSIS — Z12.31 VISIT FOR SCREENING MAMMOGRAM: ICD-10-CM

## 2024-05-14 PROCEDURE — 77080 DXA BONE DENSITY AXIAL: CPT | Performed by: FAMILY MEDICINE

## 2024-05-14 PROCEDURE — 77063 BREAST TOMOSYNTHESIS BI: CPT | Performed by: FAMILY MEDICINE

## 2024-05-14 PROCEDURE — 77067 SCR MAMMO BI INCL CAD: CPT | Performed by: FAMILY MEDICINE

## 2024-05-29 ENCOUNTER — HOSPITAL ENCOUNTER (EMERGENCY)
Age: 69
Discharge: HOME | End: 2024-05-29
Attending: EMERGENCY MEDICINE
Payer: MEDICARE

## 2024-05-29 VITALS
TEMPERATURE: 99 F | RESPIRATION RATE: 16 BRPM | WEIGHT: 150 LBS | HEART RATE: 80 BPM | DIASTOLIC BLOOD PRESSURE: 75 MMHG | SYSTOLIC BLOOD PRESSURE: 163 MMHG | OXYGEN SATURATION: 98 % | HEIGHT: 63 IN | BODY MASS INDEX: 26.58 KG/M2

## 2024-05-29 DIAGNOSIS — I10 HYPERTENSION, UNSPECIFIED TYPE: Primary | ICD-10-CM

## 2024-05-29 LAB
ANION GAP SERPL CALC-SCNC: 6 MMOL/L (ref 0–18)
BASOPHILS # BLD AUTO: 0.04 X10(3) UL (ref 0–0.2)
BASOPHILS NFR BLD AUTO: 0.5 %
BUN BLD-MCNC: 18 MG/DL (ref 9–23)
CALCIUM BLD-MCNC: 9.9 MG/DL (ref 8.5–10.1)
CHLORIDE SERPL-SCNC: 105 MMOL/L (ref 98–112)
CO2 SERPL-SCNC: 27 MMOL/L (ref 21–32)
CREAT BLD-MCNC: 1.02 MG/DL
EGFRCR SERPLBLD CKD-EPI 2021: 61 ML/MIN/1.73M2 (ref 60–?)
EOSINOPHIL # BLD AUTO: 0.16 X10(3) UL (ref 0–0.7)
EOSINOPHIL NFR BLD AUTO: 2.1 %
ERYTHROCYTE [DISTWIDTH] IN BLOOD BY AUTOMATED COUNT: 12.9 %
GLUCOSE BLD-MCNC: 123 MG/DL (ref 70–99)
HCT VFR BLD AUTO: 47.2 %
HGB BLD-MCNC: 15.7 G/DL
IMM GRANULOCYTES # BLD AUTO: 0.03 X10(3) UL (ref 0–1)
IMM GRANULOCYTES NFR BLD: 0.4 %
LYMPHOCYTES # BLD AUTO: 1.96 X10(3) UL (ref 1–4)
LYMPHOCYTES NFR BLD AUTO: 25.8 %
MCH RBC QN AUTO: 29.8 PG (ref 26–34)
MCHC RBC AUTO-ENTMCNC: 33.3 G/DL (ref 31–37)
MCV RBC AUTO: 89.7 FL
MONOCYTES # BLD AUTO: 0.4 X10(3) UL (ref 0.1–1)
MONOCYTES NFR BLD AUTO: 5.3 %
NEUTROPHILS # BLD AUTO: 5.02 X10 (3) UL (ref 1.5–7.7)
NEUTROPHILS # BLD AUTO: 5.02 X10(3) UL (ref 1.5–7.7)
NEUTROPHILS NFR BLD AUTO: 65.9 %
OSMOLALITY SERPL CALC.SUM OF ELEC: 289 MOSM/KG (ref 275–295)
PLATELET # BLD AUTO: 320 10(3)UL (ref 150–450)
POTASSIUM SERPL-SCNC: 3.7 MMOL/L (ref 3.5–5.1)
RBC # BLD AUTO: 5.26 X10(6)UL
SARS-COV-2 RNA RESP QL NAA+PROBE: NOT DETECTED
SODIUM SERPL-SCNC: 138 MMOL/L (ref 136–145)
TROPONIN I SERPL HS-MCNC: 10 NG/L
WBC # BLD AUTO: 7.6 X10(3) UL (ref 4–11)

## 2024-05-29 PROCEDURE — 84484 ASSAY OF TROPONIN QUANT: CPT | Performed by: EMERGENCY MEDICINE

## 2024-05-29 PROCEDURE — 93010 ELECTROCARDIOGRAM REPORT: CPT

## 2024-05-29 PROCEDURE — 80048 BASIC METABOLIC PNL TOTAL CA: CPT | Performed by: EMERGENCY MEDICINE

## 2024-05-29 PROCEDURE — 36415 COLL VENOUS BLD VENIPUNCTURE: CPT

## 2024-05-29 PROCEDURE — 85025 COMPLETE CBC W/AUTO DIFF WBC: CPT | Performed by: EMERGENCY MEDICINE

## 2024-05-29 PROCEDURE — 99283 EMERGENCY DEPT VISIT LOW MDM: CPT

## 2024-05-29 PROCEDURE — 93005 ELECTROCARDIOGRAM TRACING: CPT

## 2024-05-29 NOTE — ED PROVIDER NOTES
Patient Seen in: [facility] [place]      History     Chief Complaint   Patient presents with    Ear Pain    Hypertension     Stated Complaint: Pt has had ringing in right ear today and found her b/o to be elevated at home.*    Subjective:   HPI    Patient with a history of high blood pressure.  Patient had an urgent care visit earlier this month, her blood pressure was 187/93.  She had an office visit in February of this year and blood pressure was 160/88.    Patient had an office visit 1 year ago with a blood pressure 170/90  She also has a history of IgA nephropathy and follows up with nephrology.  She is on lisinopril 20 mg twice daily for both blood pressure control and renal protection patient notes that she checks her blood pressure weekly and the numbers are usually about  130-135 systolic to 80-85 diastolic    Patient was complaining of some congestion.  About 3:00, she notes some ringing in her right ear.  No headache.  No spinning dizziness.  No focal numbness or weakness.   No purulent productive cough.  No fever.  No shaking chills.    She checked her blood pressure and noticed that it was elevated.  No chest pain or shortness of breath.  No painful breathing.    Concerned about blood pressure elevation, she presents to the emergency department for evaluation              Objective:   Past Medical History:    Essential hypertension    HIGH BLOOD PRESSURE    High cholesterol    Hyperlipidemia    Hypertension    Lipid screening    Done    OTHER DISEASES    bergers disease    Pap smear for cervical cancer screening    endometrial cell found neg HPV    PONV (postoperative nausea and vomiting)    Spontaneous ecchymoses    Wears glasses              Past Surgical History:   Procedure Laterality Date    Colonoscopy      Colonoscopy      D & c      Dilation/curettage,diagnostic        90    Other surgical history  2004    renal surgery    Tubal ligation       Done @[facility]                 No pertinent social history.            Review of Systems    Positive for stated complaint: Pt has had ringing in right ear today and found her b/o to be elevated at home.*  Other systems are as noted in HPI.  Constitutional and vital signs reviewed.      All other systems reviewed and negative except as noted above.    Physical Exam     ED Triage Vitals [05/29/24 1843]   BP (!) 194/96   Pulse 85   Resp 18   Temp 98.5 °F (36.9 °C)   Temp src Oral   SpO2 98 %   O2 Device None (Room air)       Current Vitals:   Vital Signs  BP: (!) 163/75  Pulse: 80  Resp: 16  Temp: 98.5 °F (36.9 °C)  Temp src: Oral    Oxygen Therapy  SpO2: 98 %  O2 Device: None (Room air)            Physical Exam  General: The patient is awake, alert, conversant.  She appears in no distress  Eyes: sclera white, conjunctiva pink and moist.  Lids and lashes are normal.  Ears: Right TM is dull compared to the left.  No air-fluid levels or bubbles.  Canals are clear  Neck: With no JVD  Lungs: Clear to auscultation bilaterally.  No rhonchi or rales.  Heart: Normal S1 and S2, without murmur.  Distal pulses are strong and symmetric.  Extremities: Unremarkable.  Calves nonswollen, symmetric, nontender.  No pedal edema.  Skin: Not particularly pale  Neurologic:  Mental status as above.  Patient moves all extremities with good strength and coordination.           ED Course     Labs Reviewed   BASIC METABOLIC PANEL (8) - Abnormal; Notable for the following components:       Result Value    Glucose 123 (*)     All other components within normal limits   TROPONIN I HIGH SENSITIVITY - Normal   RAPID SARS-COV-2 BY PCR - Normal   CBC WITH DIFFERENTIAL WITH PLATELET    Narrative:     The following orders were created for panel order CBC With Differential With Platelet.  Procedure                               Abnormality         Status                     ---------                               -----------         ------                      CBC W/ DIFFERENTIAL[[id number]]                              Final result                 Please view results for these tests on the individual orders.   CBC W/ DIFFERENTIAL             An EKG was performed. I agree with computerized EKG interval interpretations. EKG shows sinus rhythm with unifocal PVCs. There are no acute ST changes to suggest acute ischemia or infarct  Ventricular rate 87 bpm. NE interval 190 ms. QRS duration 72 ms.           MDM      Patient complains of high blood pressure and some ringing and fullness in the right ear with some nasal congestion.  On examination, the right TM is slightly dull in comparison to the left without bright erythema or bulging or obvious air-fluid level or bubbles  This may be related to some allergies or viral illness.  Unlikely acute bacterial otitis media or sinusitis given the presentation.  Patient notes that she her blood pressure is often elevated at doctors visits as documented in my history.  It also seems that her baseline blood pressure is also technically elevated as well.  Patient may need adjustment in her blood pressure medications.   Patient without any chest pain or pressure to suggest acute coronary syndrome.  Difficult to say if her blood pressure has trended elevated and this produced the ringing in your ears or if she was experiencing ringing in the ears related to fluid causing stress on her nervous system enough for her to check her blood pressure   No emergent blood pressure lowering medications are indicated at this time.   Patient's blood pressure was closely monitored     CBC shows normal white count, hemoglobin, and platelets  metabolic panel shows normal electrolytes and creatinine  Troponin negative  COVID test negative    Follow-up blood pressure 163/75    At this point, I believe patient may be safely discharged home.  I recommend she contact her primary care doctor in the morning  I recommend she check her blood pressure daily and  record the results for her doctor to review.  She may take a antihistamine like Coricidin HBP for the congestion    Check your blood pressure daily and record the results for your doctor to review  Coricidin HBP may decongest the nose and allow the ear (eustachian) tubes to function better and give you relief of the fullness and ringing in your ear  Return for any concerning symptoms  Call your doctor in the morning to arrange follow-up                                        Medical Decision Making      Disposition and Plan     Clinical Impression:  1. Hypertension, unspecified type         Disposition:  Discharge  5/29/2024  8:44 pm    Follow-up:  Leticia Gray MD  [address]  [facility] [id number] [phone]    Call in 1 day(s)            Medications Prescribed:  Discharge Medication List as of 5/29/2024  8:50 PM

## 2024-05-29 NOTE — ED INITIAL ASSESSMENT (HPI)
Patient here with ringing in her right ear that started around 1500. States ear feels congested. Patient checked her blood pressure and noticed it to be elevated. Denies headache. Alert and oriented 4/4.

## 2024-05-30 LAB
ATRIAL RATE: 87 BPM
P AXIS: 54 DEGREES
P-R INTERVAL: 190 MS
Q-T INTERVAL: 378 MS
QRS DURATION: 72 MS
QTC CALCULATION (BEZET): 454 MS
R AXIS: -12 DEGREES
T AXIS: 50 DEGREES
VENTRICULAR RATE: 87 BPM

## 2024-05-30 NOTE — DISCHARGE INSTRUCTIONS
Check your blood pressure daily and record the results for your doctor to review  Coricidin HBP may decongest the nose and allow the ear (eustachian) tubes to function better and give you relief of the fullness and ringing in your ear  Return for any concerning symptoms  Call your doctor in the morning to arrange follow-up

## 2024-05-31 ENCOUNTER — OFFICE VISIT (OUTPATIENT)
Dept: FAMILY MEDICINE CLINIC | Facility: CLINIC | Age: 69
End: 2024-05-31

## 2024-05-31 ENCOUNTER — HOSPITAL ENCOUNTER (OUTPATIENT)
Dept: ULTRASOUND IMAGING | Facility: HOSPITAL | Age: 69
Discharge: HOME | End: 2024-05-31
Attending: FAMILY MEDICINE
Payer: MEDICARE

## 2024-05-31 VITALS
BODY MASS INDEX: 26.9 KG/M2 | HEART RATE: 92 BPM | WEIGHT: 151.81 LBS | DIASTOLIC BLOOD PRESSURE: 98 MMHG | RESPIRATION RATE: 16 BRPM | SYSTOLIC BLOOD PRESSURE: 174 MMHG | HEIGHT: 63 IN | TEMPERATURE: 98 F

## 2024-05-31 DIAGNOSIS — M79.89 LEFT LEG SWELLING: ICD-10-CM

## 2024-05-31 DIAGNOSIS — N05.9 NEPHRITIS AND NEPHROPATHY, WITH PATHOLOGICAL LESION IN KIDNEY: ICD-10-CM

## 2024-05-31 DIAGNOSIS — I10 ESSENTIAL HYPERTENSION: Primary | ICD-10-CM

## 2024-05-31 PROCEDURE — 99214 OFFICE O/P EST MOD 30 MIN: CPT | Performed by: FAMILY MEDICINE

## 2024-05-31 PROCEDURE — 93971 EXTREMITY STUDY: CPT | Performed by: FAMILY MEDICINE

## 2024-05-31 RX ORDER — AMLODIPINE BESYLATE 2.5 MG/1
2.5 TABLET ORAL DAILY
Qty: 90 TABLET | Refills: 0 | Status: SHIPPED | OUTPATIENT
Start: 2024-05-31

## 2024-05-31 NOTE — PROGRESS NOTES
Parvin Payton is a 68 year old female.     HPI:   Patient presents for an ER follow up.  Had ringing in ear and BP was elevated.  Did internet research and was worried about a heart attack.  Went to ER and w/u was normal, but did have high BP.      Has had high BP readings in the office in the past, but her home readings were normal.     She has been checking weekly for the last few months and Bps were well controlled (120-130/70s).  Last 2 days have been higher - high 130s/90s, but still much better than office and ER readings.  No BP, SOB, palpitations.    Has noticed slight swelling in left ankle/lower leg.  Does drive to [place] to seeing daughter, but no recent period of immobility. No calf TTP.  She is planning to travel there in 2 days.       HTN:  Treated with lisinopril 20mg BID    History of IgA nephropathy.  She has seen Dr. Meeks.  Last visit with him was in 2019.  She is on lisinopril 20 mg twice daily for blood pressure control, as well as long-term renal protection    Christi:  5/2023  DEXA:  8/2021  Colon:  9/2019 - repeat in 5 years.  Dr. Diana  Tdap:  5/2023  Shingrix:  4/2018, 7/2018  PCV20:  5/2023    Severe reaction to covid reaction.      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    REVIEW OF SYSTEMS:   GENERAL HEALTH: feels well otherwise  RESPIRATORY: denies shortness of breath  CARDIOVASCULAR: denies chest pain, denies palpitation, denies pedal edema  GI: denies abdominal pain  NEURO: denies headaches    EXAM:    BP (!) 174/98   Pulse 92   Temp 97.7 °F (36.5 °C)   Resp 16   Ht 5' 3\" (1.6 m)   Wt 151 lb 12.8 oz (68.9 kg)   LMP 12/23/2008   BMI 26.89 kg/m²   GENERAL: well developed, well nourished,in no apparent distress  NECK: supple,no adenopathy,no carotid bruits, no thyromegaly  LUNGS: clear to auscultation  CARDIO: RRR without murmur  EXT:  trace edema left ankle, lower leg.  No calf TTP.  Neg homans on left    ASSESSMENT AND PLAN:   1. Essential hypertension  Home readings normal for the most part.  They have trended higher over the last several weeks.  She tends to run higher in the office  Plan to add amlodipine 2.5mg daily.  Pt to continue to check daily and sent me readings.  Reviewed goal of <140/90.  Plan for US kidney  - US KIDNEY W DOPPLER (CPT=93975/83074); Future    2. Left leg swelling  Stat US done today and neg for DVT.   - US VENOUS DOPPLER LEG LEFT - DIAG IMG (CPT=93971); Future    3. 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   - US KIDNEY W DOPPLER (CPT=93975/29068); Future    F/u to f/u with me next week.     No orders of the defined types were placed in this encounter.    Meds & Refills for this Visit:  Requested Prescriptions     Signed Prescriptions Disp Refills    amLODIPine 2.5 MG Oral Tab 90 tablet 0     Sig: Take 1 tablet (2.5 mg total) by mouth daily.     Imaging & Consults:  US KIDNEY W DOPPLER (CPT=93975/16943)

## 2024-06-12 ENCOUNTER — PATIENT MESSAGE (OUTPATIENT)
Dept: FAMILY MEDICINE CLINIC | Facility: CLINIC | Age: 69
End: 2024-06-12

## 2024-06-12 NOTE — TELEPHONE ENCOUNTER
From: Parvin Payton  To: Leticia Gray  Sent: 6/12/2024 1:37 PM CDT  Subject: BP readings from home    Hello Dr. Gray,    Here are my recent BP readings. All have been taken about the same time each day.    126/86  103/79  121/79  106/85  117/89  111/94  115/96    Parvin

## 2024-06-25 ENCOUNTER — HOSPITAL ENCOUNTER (OUTPATIENT)
Dept: ULTRASOUND IMAGING | Age: 69
Discharge: HOME | End: 2024-06-25
Attending: FAMILY MEDICINE
Payer: MEDICARE

## 2024-06-25 DIAGNOSIS — N05.9 NEPHRITIS AND NEPHROPATHY, WITH PATHOLOGICAL LESION IN KIDNEY: ICD-10-CM

## 2024-06-25 DIAGNOSIS — I10 ESSENTIAL HYPERTENSION: ICD-10-CM

## 2024-06-25 PROCEDURE — 76775 US EXAM ABDO BACK WALL LIM: CPT | Performed by: FAMILY MEDICINE

## 2024-06-25 PROCEDURE — 93975 VASCULAR STUDY: CPT | Performed by: FAMILY MEDICINE

## 2024-07-07 ENCOUNTER — OFFICE VISIT (OUTPATIENT)
Dept: FAMILY MEDICINE CLINIC | Facility: CLINIC | Age: 69
End: 2024-07-07
Payer: MEDICARE

## 2024-07-07 VITALS
HEART RATE: 83 BPM | BODY MASS INDEX: 26.58 KG/M2 | SYSTOLIC BLOOD PRESSURE: 144 MMHG | HEIGHT: 63 IN | WEIGHT: 150 LBS | TEMPERATURE: 98 F | OXYGEN SATURATION: 99 % | DIASTOLIC BLOOD PRESSURE: 80 MMHG | RESPIRATION RATE: 18 BRPM

## 2024-07-07 DIAGNOSIS — H69.91 DYSFUNCTION OF RIGHT EUSTACHIAN TUBE: Primary | ICD-10-CM

## 2024-07-07 DIAGNOSIS — H93.11 RINGING IN EAR, RIGHT: ICD-10-CM

## 2024-07-07 NOTE — PROGRESS NOTES
Parvin Payton is a 68 year old female who presents for  right ear fullness sensation. Problem has been occurring for  4  days. Symptoms have been worsening since onset.  No sick contact. Denies ear discharge, swelling, or pain of the ear.  Denies fever, sinus congestion, or cough. Reports Tinnitus.    Current Outpatient Medications   Medication Sig Dispense Refill    amLODIPine 2.5 MG Oral Tab Take 1 tablet (2.5 mg total) by mouth daily. 90 tablet 0    PEG 3350-KCl-Na Bicarb-NaCl 420 g Oral Recon Soln Take as directed by physician 4000 mL 0    lisinopril 20 MG Oral Tab Take 1 tablet (20 mg total) by mouth 2 (two) times daily. 180 tablet 3      Past Medical History:    Essential hypertension    HIGH BLOOD PRESSURE    High cholesterol    Hyperlipidemia    Hypertension    Lipid screening    Done    OTHER DISEASES    bergers disease    Pap smear for cervical cancer screening    endometrial cell found neg HPV    PONV (postoperative nausea and vomiting)    Spontaneous ecchymoses    Wears glasses      Past Surgical History:   Procedure Laterality Date    Colonoscopy      Colonoscopy      D & c      Dilation/curettage,diagnostic        90    Other surgical history  2004    renal surgery    Tubal ligation      Done @[facility]       Family History   Problem Relation Age of Onset    Heart Attack Maternal Grandfather         AMI    Heart Attack Paternal Grandfather         AMI    Cancer Mother         Bladder cancer    Dementia Paternal Grandmother     Hypertension Father     Stroke Father     Dementia Father     Heart Attack Maternal Grandmother         Transient Ischemic Attack      Social History     Socioeconomic History    Marital status:    Occupational History    Occupation: homemaker   Tobacco Use    Smoking status: Never    Smokeless tobacco: Never   Vaping Use    Vaping status: Never Used   Substance and Sexual Activity    Alcohol use: Yes     Alcohol/week: 0.0 standard drinks of  alcohol     Comment: Sometimes    Drug use: No    Sexual activity: Never   Other Topics Concern    Caffeine Concern No    Sleep Concern Yes     Comment: wakes up once nightly    Stress Concern No    Weight Concern Yes    Special Diet No    Exercise Yes    Seat Belt Yes    Self-Exams Yes     Comment: self breast exam monthly     Social Determinants of Health      [facility]    Social Connections    Received from [facility], [facility]    Housing Stability         REVIEW OF SYSTEMS:   GENERAL: feels well otherwise, no fever, no chills.  SKIN: no rashes  EYES:denies blurred or double vision any other cold symptoms, no stuffy nose, no hearing issues, reports tinnitus.  HEENT: see HPI  LUNGS: denies shortness of breath with exertion, no cough.  GI: no nausea   NEURO: no headaches, no dizziness, no TMJ pain.      EXAM:   /80 (BP Location: Right arm)   Pulse 83   Temp 97.8 °F (36.6 °C) (Temporal)   Resp 18   Ht 5' 3\" (1.6 m)   Wt 150 lb (68 kg)   LMP 12/23/2008   SpO2 99%   BMI 26.57 kg/m²   GENERAL: well developed, well nourished,in no apparent distress  SKIN: no rashes,no suspicious lesions  EYES:PERRLA, EOMI,Conjunctiva normal.  HENT:  Head atraumatic, normocephalic, throat, oral mucosa are clear, no erythema. No sinus tenderness.  EARS:Bilateral hearing is intact to this writers voice. Bilat TMs: no erythema, mild bulging, scant effusion.   Bilat EACs: no tragal tenderness; EACs appear healthy  NECK: supple,no adenopathy  LUNGS: clear to auscultation bilaterally.  CARDIO: RRR without murmur      ASSESSMENT AND PLAN:   Parvin Payton is a 68 year old female who presents with ringing in right ear which began when clogged ear sensation began.     ASSESSMENT:  Encounter Diagnoses   Name Primary?    Eustachian tube dysfunction, left Yes    Tinnitus aurium, left        PLAN:   Discussed following up with  PCP/ENT after clogged ear sensation is resolved.  Meds and instructions as listed below.    Risk and benefits of medication discussed.   Call PCP if s/sx worsen, do not improve in 3 days, or if fever of 100.4 or greater persists for 72 hours.  Meds & Refills for this Visit:    Flonase\"Sensimist\" per box directions x 2 weeks  Oral antihistamine: Allegra, Claritin or Zyrtec take 1 tablet daily x 2 months.  After 2 months observe for return of allergy symptoms, if needed resume oral antihistamine.  Requested Prescriptions      No prescriptions requested or ordered in this encounter       The patient indicates understanding of these issues and agrees to the plan.

## 2024-07-15 ENCOUNTER — OFFICE VISIT (OUTPATIENT)
Dept: NEPHROLOGY | Facility: CLINIC | Age: 69
End: 2024-07-15
Payer: MEDICARE

## 2024-07-15 VITALS — SYSTOLIC BLOOD PRESSURE: 140 MMHG | BODY MASS INDEX: 26 KG/M2 | WEIGHT: 149.38 LBS | DIASTOLIC BLOOD PRESSURE: 78 MMHG

## 2024-07-15 DIAGNOSIS — I10 ESSENTIAL HYPERTENSION: Primary | ICD-10-CM

## 2024-07-15 DIAGNOSIS — N02.B9 IGA NEPHROPATHY: ICD-10-CM

## 2024-07-15 PROCEDURE — 99204 OFFICE O/P NEW MOD 45 MIN: CPT | Performed by: INTERNAL MEDICINE

## 2024-07-15 NOTE — PROGRESS NOTES
Consult Note      REASON FOR CONSULT: Hypertension/history of IgA nephropathy         HPI:   Parvin Payton is a 68 year old female with   Chief Complaint   Patient presents with    Consult     Ref'd by Dr. Gray for IgAN and HTN     Leticia Gray MD    Parvin Payton was seen in the nephrology clinic today in consultation for management of hypertension in the setting of a history of IgA nephropathy.  I most recently saw her in September 2019 and prior to that it had been more than 3 years.  Her history is significant for biopsy-proven IgA nephropathy for which she never had an issue with renal function.  In addition she has had longstanding hypertension which has been under very good control until somewhat recently.  She noted onset of feeling of fullness in her right ear associated with dizziness and ringing.  This prompted an emergency department visit and she reports that her initial blood pressure in the ER was greater than 200 systolic.  It did steadily improve, however and she was not admitted to the hospital and was discharged home.  She has follow-up with her primary care physician and has been tracking her blood pressures on a daily basis.  Amlodipine 2.5 mg was started in early June.  She does present today with a log of her home readings and her systolic readings have been excellent and typically between 105 and 120.  Diastolic readings are generally 80-85 or so.  Of note she has had a significant increase in stress in her life as her 30-year-old son in law has been told that he will require aortic valve surgery in the very near future.  She has otherwise been feeling well and the review of systems is unremarkable.  Of note renal function remains normal and she has not had any significant proteinuria or microhematuria      ROS:    Denies fever/chills  Denies wt loss/gain  Denies HA or visual changes  Denies CP or palpitations  Denies SOB/cough/hemoptysis  Denies abd or flank pain  Denies N/V/D  Denies  change in urinary habits or gross hematuria  Denies LE edema  Denies skin rashes/myalgias/arthralgias      PMH:  Past Medical History:    Essential hypertension    HIGH BLOOD PRESSURE    High cholesterol    Hyperlipidemia    Hypertension    Lipid screening    Done    OTHER DISEASES    bergers disease    Pap smear for cervical cancer screening    endometrial cell found neg HPV    PONV (postoperative nausea and vomiting)    Spontaneous ecchymoses    Wears glasses         PSH:  Past Surgical History:   Procedure Laterality Date    Colonoscopy      Colonoscopy      D & c      Dilation/curettage,diagnostic        90    Other surgical history  2004    renal surgery    Tubal ligation      Done @[facility]          Medications (Active prior to today's visit):  Current Outpatient Medications   Medication Sig Dispense Refill    amLODIPine 2.5 MG Oral Tab Take 1 tablet (2.5 mg total) by mouth daily. 90 tablet 0    PEG 3350-KCl-Na Bicarb-NaCl 420 g Oral Recon Soln Take as directed by physician 4000 mL 0    lisinopril 20 MG Oral Tab Take 1 tablet (20 mg total) by mouth 2 (two) times daily. 180 tablet 3         Allergies:  No Known Allergies    Social History:  Social History     Socioeconomic History    Marital status:    Occupational History    Occupation: homemaker   Tobacco Use    Smoking status: Never    Smokeless tobacco: Never   Vaping Use    Vaping status: Never Used   Substance and Sexual Activity    Alcohol use: Yes     Alcohol/week: 0.0 standard drinks of alcohol     Comment: Sometimes    Drug use: No    Sexual activity: Never   Other Topics Concern    Caffeine Concern No    Sleep Concern Yes     Comment: wakes up once nightly    Stress Concern No    Weight Concern Yes    Special Diet No    Exercise Yes    Seat Belt Yes    Self-Exams Yes     Comment: self breast exam monthly          Family History:  Family History   Problem Relation Age of Onset    Heart Attack Maternal Grandfather          AMI    Heart Attack Paternal Grandfather         AMI    Cancer Mother         Bladder cancer    Dementia Paternal Grandmother     Hypertension Father     Stroke Father     Dementia Father     Heart Attack Maternal Grandmother         Transient Ischemic Attack            PHYSICAL EXAM:   /78 (BP Location: Left arm, Patient Position: Sitting)   Wt 149 lb 6 oz (67.8 kg)   LMP 12/23/2008   BMI 26.46 kg/m²    Wt Readings from Last 6 Encounters:   07/15/24 149 lb 6 oz (67.8 kg)   07/07/24 150 lb (68 kg)   05/31/24 151 lb 12.8 oz (68.9 kg)   05/29/24 150 lb (68 kg)   05/09/24 150 lb (68 kg)   02/05/24 150 lb 9.6 oz (68.3 kg)     General: Alert and oriented in no apparent distress.  HEENT: No scleral icterus, MMM  Neck: Supple, no SUSANA or thyromegaly  Cardiac: Regular rate and rhythm, S1, S2 normal, no murmur or rub  Lungs: Clear without wheezes, rales, rhonchi.    Extremities: Without clubbing, cyanosis or edema.  Neurologic:  normal affect, cranial nerves grossly intact, moving all extremities  Skin: Warm and dry, no rashes      LABS:     Lab Results   Component Value Date     (H) 05/29/2024    BUN 18 05/29/2024    BUNCREA 20.9 (H) 06/11/2021    CREATSERUM 1.02 05/29/2024    ANIONGAP 6 05/29/2024    GFR 56 (L) 08/16/2017    GFRNAA 54 (L) 12/31/2021    GFRAA 62 12/31/2021    CA 9.9 05/29/2024    OSMOCALC 289 05/29/2024    ALKPHO 131 02/07/2024    AST 23 02/07/2024    ALT 39 02/07/2024    BILT 0.5 02/07/2024    TP 8.1 02/07/2024    ALB 3.8 02/07/2024    GLOBULIN 4.3 02/07/2024    AGRATIO 1.5 11/11/2013     05/29/2024    K 3.7 05/29/2024     05/29/2024    CO2 27.0 05/29/2024     Lab Results   Component Value Date    WBC 7.6 05/29/2024    RBC 5.26 05/29/2024    HGB 15.7 05/29/2024    HCT 47.2 05/29/2024    .0 05/29/2024    MPV 9.8 11/28/2011    MCV 89.7 05/29/2024    MCH 29.8 05/29/2024    MCHC 33.3 05/29/2024    RDW 12.9 05/29/2024    NEPRELIM 5.02 05/29/2024    NEPERCENT 65.9  05/29/2024    LYPERCENT 25.8 05/29/2024    MOPERCENT 5.3 05/29/2024    EOPERCENT 2.1 05/29/2024    BAPERCENT 0.5 05/29/2024    NE 5.02 05/29/2024    LYMABS 1.96 05/29/2024    MOABSO 0.40 05/29/2024    EOABSO 0.16 05/29/2024    BAABSO 0.04 05/29/2024     Lab Results   Component Value Date    MALBP 0.73 02/07/2024    CREUR 24.20 02/07/2024     Lab Results   Component Value Date    COLORUR Straw 12/31/2021    CLARITY Clear 12/31/2021    SPECGRAVITY 1.006 12/31/2021    GLUUR Negative 12/31/2021    BILUR Negative 12/31/2021    KETUR Negative 12/31/2021    BLOODURINE Negative 12/31/2021    PHURINE 6.0 12/31/2021    PROUR Negative 12/31/2021    UROBILINOGEN <2.0 12/31/2021    NITRITE Negative 12/31/2021    LEUUR Trace (A) 12/31/2021    NMIC Microscopic not indicated 01/09/2019    WBCUR 1-5 12/31/2021    RBCUR 0-2 12/31/2021    EPIUR Few (A) 12/31/2021    BACUR None Seen 12/31/2021     Labs from February 7 revealed microalbumin to creatinine ratio of 30.2 mcg/mg     ASSESSMENT/PLAN:          #1.  Hypertension-she has had longstanding hypertension which has been under very good control until somewhat recently.  Workup included a renal ultrasound with Doppler and this was also unremarkable.  I suspect that her worsening blood pressure control certainly may be stress related given things that are going on in her life.  Is also possible that she has simply had to the worsening blood pressures and requires a second agent for reasonable control.  Amlodipine was started at 2.5 mg daily and her blood pressures are much better overall.  We did discuss that this is a reasonable approach to managing her blood pressures for now and I instructed her to continue to track her pressures closely and should her systolic readings be greater than 130 or diastolic readings closer or higher than 90 to contact me and we could increase her amlodipine dosing.  For now I would make no changes in her medication regimen.    #2.  History of IgA  nephropathy-fortunately renal function remains normal and her urinalysis has been consistently bland      Thank you again for allowing me to participate in the care of your patient.  Please do not hesitate to call with any questions or concerns.          Sami Meeks MD  7/15/2024  9:05 AM

## 2024-09-05 PROBLEM — D12.4 BENIGN NEOPLASM OF DESCENDING COLON: Status: ACTIVE | Noted: 2024-09-05

## 2024-09-05 PROBLEM — Z86.0101 HISTORY OF ADENOMATOUS POLYP OF COLON: Status: ACTIVE | Noted: 2024-09-05

## 2024-10-07 NOTE — PROGRESS NOTES
[facility] Medicine Progress Note        Chief Complaint: Inpatient Follow-up for     HPI: 70-year-old female with past medical history of hypertension, hyperlipidemia, chronic hydronephrosis with left nephrostomy tube, right ureteral stent, end-stage renal disease on hemodialysis, chronic anemia presented with right flank pain for the past 5 days and also reported cloudy urine output from the urostomy CT with IV contrast showed moderate to severe hydronephrosis with enlarged right pelvic lymph node increase in size from the prior with suspected malignancy also showed duplicated collecting system with dilation of the right lower pole urology was consulted patient is status post cystoscopy with right stent exchange and bladder biopsy and they were not able to identify the left ureteral orifice therefore had left percutaneous nephrostomy. Blood culture is growing Gram-positive cocci 2/2 bottles initially patient was started on cefepime but now we added vancomycin repeat blood cultures ordered  Id was consulted MRI of the CT and L-spine was ordered that was negative for any abscess blood culture is growing MRSA TTE as such did not show any vegetation cardiology consulted for MADELYN  Bilateral hydronephrosis Status post right upper lobe ureteral stent exchange and right lower pole nephrostomy tube placement  Interval Hx:   Patient seen and examined this morning vitals stable appeared confused this morning so we had ordered stat CT of the head without contrast and ABGs  Case was discussed with patient's nurse and  on the floor.    Objective/physical exam:  General: In no acute distress, afebrile  Chest: Clear to auscultation bilaterally  Heart: RRR, +S1, S2, no appreciable murmur  Abdomen: Soft, nontender, BS +  MSK: Warm, no lower extremity edema, no clubbing or cyanosis  Neurologic:  Knows her name  VITAL SIGNS: 24 HRS MIN & MAX LAST   Temp  Min: 97.8 °F (36.6 °C)  Max:  Dx: Aftercare following surgery (Z48.89)  S/p ORIF 6/11/21  Other closed fracture of proximal end of left ulna with routine healing, subsequent encounter (U02.499W         Insurance (Authorized # of Visits):  PPO 12 visits           Authorizing Physician: 98.7 °F (37.1 °C) 98.7 °F (37.1 °C)   BP  Min: 118/69  Max: 144/85 (!) 142/77   Pulse  Min: 69  Max: 97  97   Resp  Min: 18  Max: 22 (!) 22   SpO2  Min: 95 %  Max: 97 % 97 %     I have reviewed the following labs:  Recent Labs   Lab 10/05/24  0315 10/06/24  0449 10/07/24  0541   WBC 36.16  36.16* 26.04  26.04* 25.58  25.58*   RBC 3.91* 3.91* 3.82*   HGB 11.8* 11.6* 11.4*   HCT 34.7* 32.7* 33.3*   MCV 88.7 83.6 87.2   MCH 30.2 29.7 29.8   MCHC 34.0 35.5 34.2   RDW 15.7 15.4 15.9    302 275   MPV 10.2 10.4 10.2     Recent Labs   Lab 10/04/24  0311 10/05/24  0315 10/06/24  0449 10/07/24  0541   * 131* 133* 135*   K 4.4 5.0 4.1 4.7   CL 96* 94* 91* 93*   CO2 13* 14* 22* 17*   BUN 70.0* 85.5* 57.7* 74.7*   CREATININE 6.98* 8.03* 5.84* 6.80*   CALCIUM 8.7 8.0* 8.1* 7.5*   MG 2.20  --   --   --    ALBUMIN 2.3* 2.2* 2.2* 2.3*   ALKPHOS 182*  --  219* 209*   *  --  103* 71*   AST 91*  --  89* 43*   BILITOT 0.4  --  0.4 0.4     Microbiology Results (last 7 days)       Procedure Component Value Units Date/Time    Blood culture #2 **CANNOT BE ORDERED STAT** [[id number]]  (Abnormal)  (Susceptibility) Collected: 10/03/24 2020    Order Status: Completed Specimen: Blood Updated: 10/06/24 0627     Blood Culture Methicillin resistant Staphylococcus aureus     GRAM STAIN Gram Positive Cocci, probable Staphylococcus      Seen in gram stain of broth only      1 of 1 Pediatric bottle positive    Blood culture #1 **CANNOT BE ORDERED STAT** [[id number]]  (Abnormal)  (Susceptibility) Collected: 10/03/24 2020    Order Status: Completed Specimen: Blood Updated: 10/06/24 0627     Blood Culture Methicillin resistant Staphylococcus aureus     GRAM STAIN Gram Positive Cocci, probable Staphylococcus      Seen in gram stain of broth only      2 of 2 bottles positive    Blood Culture [[id number]]  (Abnormal) Collected: 10/05/24 0620    Order Status: Completed Specimen: Blood Updated: 10/06/24 0006     GRAM STAIN Gram Positive  Physical Therapy POC 2x/week or a total of 10 more visits over a 90 day period. Patient/Family/Caregiver was advised of these findings, precautions, and treatment options and has agreed to actively participate in planning and for this course of care. Cocci, probable Staphylococcus      2 of 2 bottles positive      Seen in gram stain of broth only    Blood Culture [[id number]]  (Abnormal) Collected: 10/05/24 0620    Order Status: Completed Specimen: Blood Updated: 10/05/24 2117     GRAM STAIN Gram Positive Cocci, probable Staphylococcus      Seen in gram stain of broth only      1 of 2 Aerobic bottles positive    Urine culture [[id number]] Collected: 10/03/24 2008    Order Status: Completed Specimen: Urine Updated: 10/05/24 1003     Urine Culture No Significant Growth    Blood Culture [[id number]]     Order Status: Canceled Specimen: Blood     BCID2 Panel [[id number]]  (Abnormal) Collected: 10/03/24 2020    Order Status: Completed Specimen: Blood Updated: 10/04/24 1107     CTX-M (ESBL ) N/A     IMP (Cabapenemase ) N/A     KPC resistance gene (Carbapenemase ) N/A     mcr-1 N/A     mecA ID N/A     Comment: Note: Antimicrobial resistance can occur via multiple mechanisms. A Not Detected result for antimicrobial resistance gene(s) does not indicate antimicrobial susceptibility. Subculturing is required for species identification and susceptibility testing of   isolates.        mecA/C and MREJ (MRSA) gene Detected     NDM (Carbapenemase ) N/A     OXA-48-like (Carbapenemase ) N/A     Royal/B (VRE gene) N/A     VIM (Carbapenemase ) N/A     Enterococcus faecalis Not Detected     Enterococcus faecium Not Detected     Listeria monocytogenes Not Detected     Staphylococcus spp. Detected     Staphylococcus aureus Detected     Staphylococcus epidermidis Not Detected     Staphylococcus lugdunensis Not Detected     Streptococcus spp. Not Detected     Streptococcus agalactiae (Group B) Not Detected     Streptococcus pneumoniae Not Detected     Streptococcus pyogenes (Group A) Not Detected     Acinetobacter calcoaceticus/baumannii complex Not Detected     Bacteroides fragilis Not Detected     Enterobacterales Not Detected      min  STM left bicep x3 min  Effleurage to left hand wrist x3         --      Supine: left elbow ext 2# x3 min --           Cpx 10 min CP x10 min CP x10 min CP x10 min   HEP: 6/24/2021  Continue with HEP  6/29/2021  Use 4-8oz can from pantry for elbow exten Enterobacter cloacae complex Not Detected     Escherichia coli Not Detected     Klebsiella aerogenes Not Detected     Klebsiella oxytoca Not Detected     Klebsiella pneumoniae group Not Detected     Proteus spp. Not Detected     Salmonella spp. Not Detected     Serratia marcescens Not Detected     Haemophilus influenzae Not Detected     Neisseria meningitidis Not Detected     Pseudomonas aeruginosa Not Detected     Stenotrophomonas maltophilia Not Detected     Candida albicans Not Detected     Candida auris Not Detected     Candida glabrata Not Detected     Candida krusei Not Detected     Candida parapsilosis Not Detected     Candida tropicalis Not Detected     Cryptococcus neoformans/gattii Not Detected    Narrative:      The [employer] BCID2 Panel is a multiplexed nucleic acid test intended for the use with "[employer]® 2.0 or "[employer]® [employer] for the simultaneous qualitative detection and identification of multiple bacterial and yeast nucleic acids and select genetic determinants associated with antimicrobial resistance.  The [employer] BCID2 Panel test is performed directly on blood culture samples identified as positive by a continuous monitoring blood culture system.  Results are intended to be interpreted in conjunction with Gram stain results.             See below for Radiology    Assessment/Plan:  Bilateral hydronephrosis Status post right upper lobe ureteral stent exchange and right lower pole nephrostomy tube placement  Persistent MRSA bacteremia  Encephalopathy most likely metabolic  Abnormal tissue at the bladder base status post biopsy  Left percutaneous nephrostomy  End-stage renal disease on hemodialysis  Metabolic acidosis  Transaminitis, improving  History of hypertension, hyperlipidemia, bilateral chronic hydronephrosis with left nephrostomy tube in place and right ureter stent, ESRD on HD TTS, and chronic anemia       CT of the head without contrast was negative for any acute  infarct me ordered ABGs that showed pH of 7.4 with pCO2 of 30 PO2 of 75  I will get ammonia and TSH but patient dialysis most likely secondary to uremia  Cardiology consulted and they are planning to do MADELYN in a.m.  Blood cultures were repeated on October 5 and they are positive TTE negative, MRI of the CT and L-spine negative for any abscess   Infectious diseases wants dialysis catheter to be removed so we have reached out to Nephrology  Patient was dialyzed yesterday   Continue with vancomycin patient was given a dose of Rocephin since patient had a lot of manipulation done in the urinary track   urology following  Repeat blood work in a.m.    Prophylaxis: Lovenox  VTE prophylaxis:     Patient condition:  Stable/Fair/Guarded/ Serious/ Critical    Anticipated discharge and Disposition:         All diagnosis and differential diagnosis have been reviewed; assessment and plan has been documented; I have personally reviewed the labs and test results that are presently available; I have reviewed the patients medication list; I have reviewed the consulting providers response and recommendations. I have reviewed or attempted to review medical records based upon their availability    All of the patient's questions have been  addressed and answered. Patient's is agreeable to the above stated plan. I will continue to monitor closely and make adjustments to medical management as needed.    Portions of this note dictated using EMR integrated voice recognition software, and may be subject to voice recognition errors not corrected at proofreading. Please contact writer for clarification if needed.   _____________________________________________________________________    Malnutrition Status:    Scheduled Med:   atorvastatin  20 mg Oral QHS    cefTRIAXone (Rocephin) IV (PEDS and ADULTS)  1 g Intravenous Once    enoxaparin  30 mg Subcutaneous Daily    folic acid  1 mg Oral Daily    labetaloL  200 mg Oral Q12H    mupirocin   Nasal  BID    ondansetron  4 mg Intravenous Once    pantoprazole  40 mg Oral Daily    sevelamer carbonate  800 mg Oral TID WM      Continuous Infusions:       PRN Meds:    Current Facility-Administered Medications:     acetaminophen, 650 mg, Oral, Q4H PRN    albuterol-ipratropium, 3 mL, Nebulization, Once PRN    aluminum-magnesium hydroxide-simethicone, 30 mL, Oral, QID PRN    diphenhydrAMINE, 25 mg, Intravenous, Q6H PRN    HYDROcodone-acetaminophen, 1 tablet, Oral, Q6H PRN    melatonin, 6 mg, Oral, Nightly PRN    metoclopramide, 10 mg, Intravenous, Q10 Min PRN    ondansetron, 4 mg, Intravenous, Q4H PRN    polyethylene glycol, 17 g, Oral, BID PRN    prochlorperazine, 5 mg, Intravenous, Q6H PRN    senna-docusate 8.6-50 mg, 2 tablet, Oral, BID PRN    sodium chloride 0.9%, 10 mL, Intravenous, PRN    vancomycin - pharmacy to dose, , Intravenous, pharmacy to manage frequency     Radiology:  I have personally reviewed the following imaging and agree with the radiologist.     Echo    Left Ventricle: The left ventricle is normal in size. Increased wall   thickness. There is normal systolic function with a visually estimated   ejection fraction of 60 - 65%. Grade I diastolic dysfunction.    Right Ventricle: Normal right ventricular cavity size. Systolic   function is normal. TAPSE is 1.88 cm.    Left Atrium: Left atrium is dilated.    Mitral Valve: There is mitral annular calcification present. There is   trace regurgitation.    Tricuspid Valve: There is moderate regurgitation.    Pulmonary Artery: The estimated pulmonary artery systolic pressure is   44 mmHg.    IVC/SVC: Normal venous pressure at 3 mmHg.      Macarena Leggett MD  Department of Hospital Medicine   [facility]   10/07/2024

## 2024-11-08 RX ORDER — AMLODIPINE BESYLATE 2.5 MG/1
2.5 TABLET ORAL DAILY
Qty: 90 TABLET | Refills: 3 | Status: SHIPPED | OUTPATIENT
Start: 2024-11-08

## 2025-01-02 NOTE — PROGRESS NOTES
Physical Therapy    Patient not seen in therapy.     Unavailable due to medical tests/procedures.      PT evaluation orders received and chart reviewed.  Patient was unable to participate in skilled therapy at this time secondary to pt off unit at MRI.  Skilled therapy will attempt to see patient later in the day as schedules permits.          OBJECTIVE                            Therapy procedure time and total treatment time can be found documented on the Time Entry flowsheet   Please f/u with pt

## 2025-01-06 DIAGNOSIS — I10 ESSENTIAL HYPERTENSION: Primary | ICD-10-CM

## 2025-01-06 DIAGNOSIS — N02.B9 IGA NEPHROPATHY: ICD-10-CM

## 2025-01-30 ENCOUNTER — OFFICE VISIT (OUTPATIENT)
Dept: FAMILY MEDICINE CLINIC | Facility: CLINIC | Age: 70
End: 2025-01-30
Payer: MEDICARE

## 2025-01-30 VITALS
DIASTOLIC BLOOD PRESSURE: 86 MMHG | RESPIRATION RATE: 18 BRPM | BODY MASS INDEX: 27 KG/M2 | HEART RATE: 78 BPM | WEIGHT: 150 LBS | SYSTOLIC BLOOD PRESSURE: 130 MMHG | OXYGEN SATURATION: 99 %

## 2025-01-30 DIAGNOSIS — N02.B9 IGA NEPHROPATHY: ICD-10-CM

## 2025-01-30 DIAGNOSIS — I10 ESSENTIAL HYPERTENSION: Primary | ICD-10-CM

## 2025-01-30 PROCEDURE — 99214 OFFICE O/P EST MOD 30 MIN: CPT | Performed by: NURSE PRACTITIONER

## 2025-01-30 RX ORDER — LISINOPRIL 40 MG/1
40 TABLET ORAL DAILY
Qty: 90 TABLET | Refills: 0 | Status: SHIPPED | OUTPATIENT
Start: 2025-01-30

## 2025-01-30 RX ORDER — AMLODIPINE BESYLATE 2.5 MG/1
2.5 TABLET ORAL EVERY EVENING
Qty: 90 TABLET | Refills: 0 | Status: SHIPPED | OUTPATIENT
Start: 2025-01-30

## 2025-01-30 NOTE — PROGRESS NOTES
Parvin Payton is a 69 year old female presenting for follow up of HTN   Chief Complaint   Patient presents with    Blood Pressure     F/U     Swelling     Per pt has swelling on left leg x 2 weeks      HPI:     Patient presents for follow up of HTN in setting of IgA nephropathy. In office today reports has been taking medications as ordered. Has noted some mild swelling of lower legs. Is checking BP at home. Reported in the range of 110's-120's/90's-100's. Denies chest pain, palpitations, SOB, BYRD, headaches, dizziness, lightheadedness, visual changes. Has seen Dr. Meeks for this, most recently in 2024, notes appreciated by me. HTN was well regulated at that time. He recommended continuing on ACE to protect renal function and agreed with amlodipine for additional HTN management.     Current Outpatient Medications   Medication Sig Dispense Refill    lisinopril 40 MG Oral Tab Take 1 tablet (40 mg total) by mouth daily. 90 tablet 0    amLODIPine 2.5 MG Oral Tab Take 1 tablet (2.5 mg total) by mouth every evening. 90 tablet 0      Past Medical History:    Essential hypertension    HIGH BLOOD PRESSURE    High cholesterol    Hyperlipidemia    Hypertension    Lipid screening    Done    Obesity    OTHER DISEASES    bergers disease    Pap smear for cervical cancer screening    endometrial cell found neg HPV    PONV (postoperative nausea and vomiting)    Spontaneous ecchymoses    Wears glasses      Past Surgical History:   Procedure Laterality Date    Colonoscopy      Colonoscopy      D & c      Dilation/curettage,diagnostic        90    Other surgical history  2004    renal surgery    [place]       Social History:  Smoking: Denies     REVIEW OF SYSTEMS:   GENERAL HEALTH: feels well otherwise  RESPIRATORY: as above  CARDIOVASCULAR: as above  NEURO: as above    EXAM:   /86   Pulse 78   Resp 18   Wt 150 lb (68 kg)   LMP 2008   SpO2 99%   BMI  26.57 kg/m²   GENERAL: well developed, well nourished,in no apparent distress  HEENT: atraumatic, normocephalic  NECK: supple,no adenopathy,no bruits  LUNGS: clear to auscultation bilaterally w/o wheezes, rhonchi or rales.   CARDIO: RRR without murmur  EXTREMITIES: no cyanosis or clubbing. No significant edema to lower legs, pedal pulses easily palpable bilaterally.     ASSESSMENT AND PLAN:     Encounter Diagnosis   Name Primary?    Essential hypertension-  to lisinopril 40mg in AM and amlodipine 2.5mg in PM. Check BP as per patient instructions and see me in 3-4 weeks for follow up. If still not to goal with home readings will increase amlodipine to 5mg. Verbalized understanding of instructions and agreeable to this plan of care.    Yes    IgA nephropathy- management per Dr. Meeks.         No orders of the defined types were placed in this encounter.      Meds & Refills for this Visit:  Requested Prescriptions     Signed Prescriptions Disp Refills    lisinopril 40 MG Oral Tab 90 tablet 0     Sig: Take 1 tablet (40 mg total) by mouth daily.    amLODIPine 2.5 MG Oral Tab 90 tablet 0     Sig: Take 1 tablet (2.5 mg total) by mouth every evening.       Imaging & Consults:  None    Return in about 4 weeks (around 2/27/2025) for BP follow up. .  Patient Instructions                         Check blood pressure 2-3 times weekly at a different time of day each check.     Sit quietly for at least 5 minutes prior to checking blood pressure.    Sit up straight with both feet flat on the floor, with arm elevated to approximately the level of your heart.     Record blood pressure readings (date, time and result) and bring readings with you to next visit in our office.  If more than 30% of readings are over 140 on the top or 90 on the bottom, notify our office.

## 2025-01-30 NOTE — PATIENT INSTRUCTIONS
Check blood pressure 2-3 times weekly at a different time of day each check.     Sit quietly for at least 5 minutes prior to checking blood pressure.    Sit up straight with both feet flat on the floor, with arm elevated to approximately the level of your heart.     Record blood pressure readings (date, time and result) and bring readings with you to next visit in our office.  If more than 30% of readings are over 140 on the top or 90 on the bottom, notify our office.

## 2025-02-27 ENCOUNTER — OFFICE VISIT (OUTPATIENT)
Dept: FAMILY MEDICINE CLINIC | Facility: CLINIC | Age: 70
End: 2025-02-27
Payer: MEDICARE

## 2025-02-27 VITALS
DIASTOLIC BLOOD PRESSURE: 78 MMHG | HEART RATE: 98 BPM | SYSTOLIC BLOOD PRESSURE: 130 MMHG | RESPIRATION RATE: 18 BRPM | BODY MASS INDEX: 26.93 KG/M2 | HEIGHT: 63 IN | OXYGEN SATURATION: 99 % | WEIGHT: 152 LBS

## 2025-02-27 DIAGNOSIS — I10 ESSENTIAL HYPERTENSION: Primary | ICD-10-CM

## 2025-02-27 PROCEDURE — 99213 OFFICE O/P EST LOW 20 MIN: CPT | Performed by: NURSE PRACTITIONER

## 2025-02-27 RX ORDER — LISINOPRIL 40 MG/1
40 TABLET ORAL DAILY
Qty: 90 TABLET | Refills: 2 | Status: SHIPPED | OUTPATIENT
Start: 2025-04-14

## 2025-02-27 RX ORDER — AMLODIPINE BESYLATE 2.5 MG/1
2.5 TABLET ORAL EVERY EVENING
Qty: 90 TABLET | Refills: 2 | Status: SHIPPED | OUTPATIENT
Start: 2025-04-14

## 2025-02-27 NOTE — PROGRESS NOTES
Parvin Payton is a 69 year old female presenting for follow up of HTN   Chief Complaint   Patient presents with    Blood Pressure     F/u on lisinopril & Amlodipine      HPI:     Patient presents for follow up of HTN in setting of IgA nephropathy. At last visit on 25 management was changed to take lisinopril 40mg in the mornings and amlodipine 2.5mg in the evening. In office today reports has been taking medications as ordered, w/o reported side effects. Is checking BP at home. Reported in the range of 120's/80's. Denies chest pain, palpitations, SOB, BYRD, headaches, dizziness, lightheadedness, visual changes. Does follow with Dr. Meeks for IgA nephropathy management, most recently in 2024, notes appreciated by me. HTN was well regulated at that time. He recommended continuing on ACE to protect renal function and agreed with amlodipine for additional HTN management.     Current Outpatient Medications   Medication Sig Dispense Refill    [START ON 2025] amLODIPine 2.5 MG Oral Tab Take 1 tablet (2.5 mg total) by mouth every evening. 90 tablet 2    [START ON 2025] lisinopril 40 MG Oral Tab Take 1 tablet (40 mg total) by mouth daily. 90 tablet 2      Past Medical History:    Essential hypertension    HIGH BLOOD PRESSURE    High cholesterol    Hyperlipidemia    Hypertension    Lipid screening    Done    Obesity    OTHER DISEASES    bergers disease    Pap smear for cervical cancer screening    endometrial cell found neg HPV    PONV (postoperative nausea and vomiting)    Spontaneous ecchymoses    Wears glasses      Past Surgical History:   Procedure Laterality Date    Colonoscopy      Colonoscopy      D & c      Dilation/curettage,diagnostic        90    Other surgical history  2004    renal surgery    [place]       Social History:  Smoking: Denies     REVIEW OF SYSTEMS:   GENERAL HEALTH: feels well otherwise  RESPIRATORY: as  above  CARDIOVASCULAR: as above  NEURO: as above    EXAM:   /78   Pulse 98   Resp 18   Ht 5' 3\" (1.6 m)   Wt 152 lb (68.9 kg)   LMP 12/23/2008   SpO2 99%   BMI 26.93 kg/m²   GENERAL: well developed, well nourished,in no apparent distress  HEENT: atraumatic, normocephalic  NECK: supple,no adenopathy,no bruits  LUNGS: clear to auscultation bilaterally w/o wheezes, rhonchi or rales.   CARDIO: RRR without murmur  EXTREMITIES: no cyanosis, clubbing or edema    ASSESSMENT AND PLAN:     Encounter Diagnosis   Name Primary?    Essential hypertension- BP readings to goal. Continue current management, refills provided.    Yes       No orders of the defined types were placed in this encounter.      Meds & Refills for this Visit:  Requested Prescriptions     Signed Prescriptions Disp Refills    amLODIPine 2.5 MG Oral Tab 90 tablet 2     Sig: Take 1 tablet (2.5 mg total) by mouth every evening.    lisinopril 40 MG Oral Tab 90 tablet 2     Sig: Take 1 tablet (40 mg total) by mouth daily.       Imaging & Consults:  None    No follow-ups on file.  There are no Patient Instructions on file for this visit.

## 2025-02-27 NOTE — PATIENT INSTRUCTIONS
Check blood pressure 2 times weekly at a different time of day each check.     Sit quietly for at least 5 minutes prior to checking blood pressure.    Sit up straight with both feet flat on the floor, with arm elevated to approximately the level of your heart.     Record blood pressure readings (date, time and result) and bring readings with you to next visit in our office.  If more than 30% of readings are over 140 on the top or 90 on the bottom, notify our office.

## 2025-03-17 ENCOUNTER — TELEPHONE (OUTPATIENT)
Dept: FAMILY MEDICINE CLINIC | Facility: CLINIC | Age: 70
End: 2025-03-17

## 2025-03-17 DIAGNOSIS — R73.9 HYPERGLYCEMIA: ICD-10-CM

## 2025-03-17 DIAGNOSIS — E78.5 DYSLIPIDEMIA: ICD-10-CM

## 2025-03-17 DIAGNOSIS — N02.B9 IGA NEPHROPATHY: Primary | ICD-10-CM

## 2025-04-14 ENCOUNTER — PATIENT MESSAGE (OUTPATIENT)
Dept: FAMILY MEDICINE CLINIC | Facility: CLINIC | Age: 70
End: 2025-04-14

## 2025-04-14 DIAGNOSIS — Z12.31 VISIT FOR SCREENING MAMMOGRAM: Primary | ICD-10-CM

## 2025-05-10 ENCOUNTER — LAB ENCOUNTER (OUTPATIENT)
Dept: LAB | Age: 70
End: 2025-05-10
Attending: FAMILY MEDICINE
Payer: MEDICARE

## 2025-05-10 DIAGNOSIS — E78.5 DYSLIPIDEMIA: ICD-10-CM

## 2025-05-10 DIAGNOSIS — N02.B9 IGA NEPHROPATHY: ICD-10-CM

## 2025-05-10 DIAGNOSIS — R73.9 HYPERGLYCEMIA: ICD-10-CM

## 2025-05-10 LAB
ALBUMIN SERPL-MCNC: 4.6 G/DL (ref 3.2–4.8)
ALBUMIN/GLOB SERPL: 1.7 {RATIO} (ref 1–2)
ALP LIVER SERPL-CCNC: 103 U/L (ref 55–142)
ALT SERPL-CCNC: 19 U/L (ref 10–49)
ANION GAP SERPL CALC-SCNC: 6 MMOL/L (ref 0–18)
AST SERPL-CCNC: 25 U/L (ref ?–34)
BILIRUB SERPL-MCNC: 0.7 MG/DL (ref 0.2–1.1)
BUN BLD-MCNC: 20 MG/DL (ref 9–23)
CALCIUM BLD-MCNC: 9.6 MG/DL (ref 8.7–10.6)
CHLORIDE SERPL-SCNC: 105 MMOL/L (ref 98–112)
CHOLEST SERPL-MCNC: 154 MG/DL (ref ?–200)
CO2 SERPL-SCNC: 27 MMOL/L (ref 21–32)
CREAT BLD-MCNC: 1.17 MG/DL (ref 0.55–1.02)
CREAT UR-SCNC: 61.6 MG/DL
EGFRCR SERPLBLD CKD-EPI 2021: 51 ML/MIN/1.73M2 (ref 60–?)
EST. AVERAGE GLUCOSE BLD GHB EST-MCNC: 120 MG/DL (ref 68–126)
FASTING PATIENT LIPID ANSWER: YES
FASTING STATUS PATIENT QL REPORTED: YES
GLOBULIN PLAS-MCNC: 2.7 G/DL (ref 2–3.5)
GLUCOSE BLD-MCNC: 105 MG/DL (ref 70–99)
HBA1C MFR BLD: 5.8 % (ref ?–5.7)
HDLC SERPL-MCNC: 41 MG/DL (ref 40–59)
LDLC SERPL CALC-MCNC: 92 MG/DL (ref ?–100)
MICROALBUMIN UR-MCNC: <0.3 MG/DL
NONHDLC SERPL-MCNC: 113 MG/DL (ref ?–130)
OSMOLALITY SERPL CALC.SUM OF ELEC: 289 MOSM/KG (ref 275–295)
POTASSIUM SERPL-SCNC: 4.5 MMOL/L (ref 3.5–5.1)
PROT SERPL-MCNC: 7.3 G/DL (ref 5.7–8.2)
SODIUM SERPL-SCNC: 138 MMOL/L (ref 136–145)
TRIGL SERPL-MCNC: 114 MG/DL (ref 30–149)
VLDLC SERPL CALC-MCNC: 19 MG/DL (ref 0–30)

## 2025-05-10 PROCEDURE — 82570 ASSAY OF URINE CREATININE: CPT

## 2025-05-10 PROCEDURE — 36415 COLL VENOUS BLD VENIPUNCTURE: CPT

## 2025-05-10 PROCEDURE — 82043 UR ALBUMIN QUANTITATIVE: CPT

## 2025-05-10 PROCEDURE — 83036 HEMOGLOBIN GLYCOSYLATED A1C: CPT

## 2025-05-10 PROCEDURE — 80053 COMPREHEN METABOLIC PANEL: CPT

## 2025-05-10 PROCEDURE — 80061 LIPID PANEL: CPT

## 2025-05-13 ENCOUNTER — OFFICE VISIT (OUTPATIENT)
Dept: FAMILY MEDICINE CLINIC | Facility: CLINIC | Age: 70
End: 2025-05-13
Payer: MEDICARE

## 2025-05-13 VITALS
SYSTOLIC BLOOD PRESSURE: 124 MMHG | DIASTOLIC BLOOD PRESSURE: 82 MMHG | TEMPERATURE: 99 F | HEART RATE: 92 BPM | WEIGHT: 148.19 LBS | HEIGHT: 63 IN | RESPIRATION RATE: 16 BRPM | BODY MASS INDEX: 26.26 KG/M2

## 2025-05-13 DIAGNOSIS — E78.1 PURE HYPERGLYCERIDEMIA: ICD-10-CM

## 2025-05-13 DIAGNOSIS — I10 ESSENTIAL HYPERTENSION: ICD-10-CM

## 2025-05-13 DIAGNOSIS — Z87.448 HISTORY OF PRIMARY IGA NEPHROPATHY: ICD-10-CM

## 2025-05-13 DIAGNOSIS — Z00.00 ENCOUNTER FOR ANNUAL HEALTH EXAMINATION: Primary | ICD-10-CM

## 2025-05-13 PROBLEM — D12.4 BENIGN NEOPLASM OF DESCENDING COLON: Status: RESOLVED | Noted: 2024-09-05 | Resolved: 2025-05-13

## 2025-05-13 PROCEDURE — G0439 PPPS, SUBSEQ VISIT: HCPCS | Performed by: FAMILY MEDICINE

## 2025-05-13 NOTE — PATIENT INSTRUCTIONS
[address]        NAME: Albino Friday is a [de-identified] y o  female  : 1939    MRN: [id number]  DATE: 2022  TIME: 10:01 AM    Assessment and Plan   Chest pain, unspecified type [R07 9]  1  Chest pain, unspecified type  ECG 12 lead    ECG 12 lead    Transfer to other facility       - EKG revealed NSR  - VS stable with HR 77, SPO2 98%, no tachypnea (RR 18)  /97  - Pain not reproducible so recommended further evaluation  Called ambulance to transport patient to ER as she did not have anyone to drive her     Patient Instructions       Follow up with PCP in 3-5 days  Proceed to  ER if symptoms worsen  Chief Complaint     Chief Complaint   Patient presents with    Chest Pain         History of Present Illness       Patient is an [de-identified] yo female who presents with a cc of chest pain x 4 days  The pain is located in the center of her chest and she denies radiation  She describes it as a tightness  She reports that the pain began relatively suddenly  The pain is constant and there are no aggravating or alleviating factors  She states that she thought her symptoms were due to her bra being too tight but she has tried different bras and pain has persisted  She has been taking Tylenol without relief  She also has issues with acid reflux and has been taking her reflux medications also without relief  She has never experienced similar symptoms  She denies any personal history of heart disease or previous PE  She also denies nausea, vomiting, diaphoresis, shortness of breath, palpitations  She further denies any leg swelling and any recent travel, surgery, or immobilization  She is not on any hormonal medication        Review of Systems   Review of Systems   Respiratory: Positive for chest tightness  Negative for shortness of breath  Cardiovascular: Positive for chest pain  Negative for palpitations and leg swelling  Gastrointestinal: Negative for abdominal pain     Neurological: Negative for A heart scan, a CT scan of the heart, is the safest and most accurate screening tool for detecting the early build-up of calcium in the coronary arteries, the most common cause of heart disease. This simple, painless and potentially lifesaving test takes just 15 minutes.    To schedule call [phone]    Heart scan locations:  [facility]  [facility] (enter through the Emergency Dept.)    Make an appointment for a heart scan if you are male over age 40 or a female over age 45, and have one or more of these risk factors:  High blood pressure  High cholesterol  Smoking  Obesity  Diabetes  Family history of heart disease    numbness and headaches           Current Medications       Current Outpatient Medications:     albuterol (PROVENTIL HFA,VENTOLIN HFA) 90 mcg/act inhaler, Inhale 1 puff every 6 (six) hours as needed for wheezing, Disp: 3 Inhaler, Rfl: 5    aspirin 81 mg chewable tablet, Chew 81 mg daily, Disp: , Rfl:     azelastine (ASTELIN) 0 1 % nasal spray, 1 spray into each nostril 2 (two) times a day Use in each nostril as directed, Disp: 1 Bottle, Rfl: 3    B Complex-Folic Acid (B COMPLEX FORMULA 1) TABS, Take by mouth, Disp: , Rfl:     benzonatate (TESSALON PERLES) 100 mg capsule, Take 100 mg by mouth 3 (three) times a day as needed for cough, Disp: , Rfl:     budesonide-formoterol (Symbicort) 160-4 5 mcg/act inhaler, Inhale 2 puffs 2 (two) times a day Rinse mouth after use , Disp: 30 6 g, Rfl: 2    FLUoxetine (PROzac) 40 MG capsule, Take 40 mg by mouth daily, Disp: , Rfl:     hydrochlorothiazide (HYDRODIURIL) 25 mg tablet, Take 25 mg by mouth daily, Disp: , Rfl:     ibuprofen (MOTRIN) 800 mg tablet, Take 1 tablet (800 mg total) by mouth 3 (three) times a day, Disp: 21 tablet, Rfl: 0    levothyroxine 50 mcg tablet, Take 50 mcg by mouth daily, Disp: , Rfl:     montelukast (SINGULAIR) 10 mg tablet, Take 1 tablet (10 mg total) by mouth daily at bedtime, Disp: 90 tablet, Rfl: 3    pantoprazole (PROTONIX) 40 mg tablet, Take 40 mg by mouth daily, Disp: , Rfl:     sodium chloride (OCEAN) 0 65 % nasal spray, 1 spray into each nostril as needed for congestion or rhinitis, Disp: 15 mL, Rfl: 3    albuterol (ACCUNEB) 0 63 MG/3ML nebulizer solution, Take 3 mL (0 63 mg total) by nebulization every 6 (six) hours as needed for wheezing or shortness of breath (Patient not taking: Reported on 7/27/2022), Disp: 1080 mL, Rfl: 3    budesonide-formoterol (SYMBICORT) 160-4 5 mcg/act inhaler, Symbicort 160 mcg-4 5 mcg/actuation HFA aerosol inhaler (Patient not taking: Reported on 7/27/2022), Disp: , Rfl:     clotrimazole (MYCELEX) 10 mg james, Take 1 tablet (10 mg total) by mouth 5 (five) times a day (Patient not taking: Reported on 7/27/2022), Disp: 70 tablet, Rfl: 0    LORazepam (ATIVAN) 0 5 mg tablet, lorazepam 0 5 mg tablet (Patient not taking: Reported on 7/27/2022), Disp: , Rfl:     predniSONE 20 mg tablet, Use 2 tablets for 5 days 1 tablet for 5 days and half a tablet for 5 days (Patient not taking: Reported on 7/27/2022), Disp: 18 tablet, Rfl: 0    simvastatin (ZOCOR) 10 mg tablet, Take 10 mg by mouth daily at bedtime (Patient not taking: Reported on 7/27/2022), Disp: , Rfl:     Zoster Vaccine Live 90384 UNT/0 65ML SUSR, Zostavax (PF) 19,400 unit/0 65 mL subcutaneous suspension (Patient not taking: Reported on 7/27/2022), Disp: , Rfl:     Current Allergies     Allergies as of 07/27/2022 - Reviewed 07/27/2022   Allergen Reaction Noted    Fruit c [ascorbate - food allergy]  04/17/2018    Seasonal ic [cholestatin] Fatigue 05/13/2014            The following portions of the patient's history were reviewed and updated as appropriate: allergies, current medications, past family history, past medical history, past social history, past surgical history and problem list      Past Medical History:   Diagnosis Date    Anxiety     COPD (chronic obstructive pulmonary disease) ([facility] [address] )     Depression     Disease of thyroid gland     Hyperlipidemia     Hypotension     Wrist fracture        Past Surgical History:   Procedure Laterality Date    BACK SURGERY  2017    INCONTINENCE SURGERY      NEPHRECTOMY TRANSPLANTED ORGAN      REPLACEMENT TOTAL KNEE BILATERAL         History reviewed  No pertinent family history  Medications have been verified  Objective   /97   Pulse 77   Temp 97 9 °F (36 6 °C)   Resp 18   Ht 5' 2" (1 575 m)   Wt 87 5 kg (193 lb)   SpO2 98%   BMI 35 30 kg/m²        Physical Exam     Physical Exam  Constitutional:       General: She is not in acute distress       Appearance: She is not toxic-appearing or diaphoretic  Cardiovascular:      Rate and Rhythm: Normal rate and regular rhythm  Heart sounds: No murmur heard  No friction rub  No gallop  No S3 sounds  Pulmonary:      Effort: Pulmonary effort is normal  No tachypnea  Breath sounds: Normal breath sounds  Chest:      Chest wall: No tenderness  Musculoskeletal:      Right lower leg: No tenderness  No edema  Left lower leg: No tenderness  No edema  Skin:     General: Skin is warm and dry  Neurological:      Mental Status: She is alert     Psychiatric:         Mood and Affect: Mood normal          Behavior: Behavior normal

## 2025-05-13 NOTE — PROGRESS NOTES
Subjective:   Parvin Payton is a 69 year old female who presents for a Medicare Subsequent Annual Wellness visit (Pt already had Initial Annual Wellness) and scheduled follow up of multiple significant but stable problems.     HTN:  Treated with lisinopril 40mg in AM, amlodipine 2.5mg daily in PM  Home readings 120/80    History of IgA nephropathy.  She has seen Dr. Meeks.  She is on lisinopril for blood pressure control, as well as long-term renal protection    Cr 1.17 - stable    A1c 5.8    Christi:  scheduled 6/2025  DEXA:  5/2024 - Osteopenia  Colon:  9/2024 - repeat in 7 years.  Dr. Diana  Tdap:  5/2023  Shingrix:  4/2018, 7/2018  PCV20:  5/2023    Severe reaction to covid reaction.      2 children, 1 granddaughter; grandson due soon      Wt Readings from Last 6 Encounters:   05/13/25 148 lb 3.2 oz (67.2 kg)   02/27/25 152 lb (68.9 kg)   01/30/25 150 lb (68 kg)   08/06/24 150 lb (68 kg)   07/15/24 149 lb 6 oz (67.8 kg)   07/07/24 150 lb (68 kg)         History/Other:   Fall Risk Assessment:   She has been screened for Falls and is low risk.      Cognitive Assessment:   She had a completely normal cognitive assessment - see flowsheet entries       Functional Ability/Status:   Parvin Payton has a completely normal functional assessment. See flowsheet for details.      Depression Screening (PHQ-2/PHQ-9): PHQ-2 SCORE: 0  , done 5/13/2025          Advanced Directives:   She does have a Living Will but we do NOT have it on file in Epic.    She does have a POA but we do NOT have it on file in Epic.        Patient Active Problem List   Diagnosis    Pure hyperglyceridemia    Vitamin D deficiency    Nephritis and nephropathy, with pathological lesion in kidney    Hammonds's disease    Essential hypertension    Microscopic hematuria    History of adenomatous polyp of colon    History of primary IgA nephropathy     Allergies:  She has no known allergies.    Current Medications:  Outpatient Medications Marked  as Taking for the 25 encounter (Office Visit) with Leticia Gray MD   Medication Sig    amLODIPine 2.5 MG Oral Tab Take 1 tablet (2.5 mg total) by mouth every evening.    lisinopril 40 MG Oral Tab Take 1 tablet (40 mg total) by mouth daily.       Medical History:  She  has a past medical history of Essential hypertension, HIGH BLOOD PRESSURE, High cholesterol, Hyperlipidemia, Hypertension, Lipid screening (2011), Obesity, OTHER DISEASES, Pap smear for cervical cancer screening (2013), PONV (postoperative nausea and vomiting), Spontaneous ecchymoses, and Wears glasses.  Surgical History:  She  has a past surgical history that includes other surgical history (2004); tubal ligation (); dilation/curettage,diagnostic; colonoscopy; colonoscopy (); d & c; and  (90).   Family History:  Her family history includes Cancer in her mother; Colon Polyps in her self; Dementia in her father and paternal grandmother; Heart Attack in her maternal grandfather, maternal grandmother, and paternal grandfather; Hypertension in her father and self; Other in her maternal grandfather; Stroke in her father; Ulcerative Colitis in her brother.  Social History:  She  reports that she has never smoked. She has never used smokeless tobacco. She reports current alcohol use of about 1.0 standard drink of alcohol per week. She reports that she does not use drugs.    Tobacco:  She has never smoked tobacco.    CAGE Alcohol Screen:   CAGE screening score of 0 on 2025, showing low risk of alcohol abuse.      Patient Care Team:  Leticia Gray MD as PCP - General (Family Practice)  Sami Meeks MD (NEPHROLOGY)  Shadia Garcia PT as Physical Therapist  Ashanti Nascimento MD (SURGERY, ORTHOPEDIC)  Jd Poe PT as Physical Therapist  Cecilia Castillo OT as Occupational Therapist (Occupational Therapist)    Review of Systems  GEN:  No fever or fatigue  HEENT:  No vision or hearing concerns.  No  dental problems.  HEART:  No chest pain or palpitations  LUNG:  No SOB, cough or wheeze  GI:  No abdominal pain.  No N/V/D/C  :  No dysuria  EXT:  No joint pain.  No LE swelling  PSYCH:  No mood concerns or anxiety    Objective:   Physical Exam  GEN:  Alert, NAD  HEENT:  PERRL, no nasal discharge, O/P normal without erythema or exudate, dentition good  NECK:  No LAD, no thyromegaly  HEART:  RRR, no MRGs  LUNG:  CTA bilaterally, no RRWs  AB:  Soft, nontender, nondistended.  No palpable masses  EXT: no pedal edema  PSYCH:  Mood appropriate    /82   Pulse 92   Temp 98.9 °F (37.2 °C)   Resp 16   Ht 5' 3\" (1.6 m)   Wt 148 lb 3.2 oz (67.2 kg)   LMP 12/23/2008   BMI 26.25 kg/m²  Estimated body mass index is 26.25 kg/m² as calculated from the following:    Height as of this encounter: 5' 3\" (1.6 m).    Weight as of this encounter: 148 lb 3.2 oz (67.2 kg).    Medicare Hearing Assessment:   Hearing Screening    Screening Method: Whisper Test  Whisper Test Result: Pass         Visual Acuity:   Right Eye Visual Acuity: Corrected Right Eye Chart Acuity: 20/40   Left Eye Visual Acuity: Corrected Left Eye Chart Acuity: 20/40   Both Eyes Visual Acuity: Corrected Both Eyes Chart Acuity: 20/25   Able To Tolerate Visual Acuity: Yes        Assessment & Plan:   Parvin Payton is a 69 year old female who presents for a Medicare Assessment.     1. Encounter for annual health examination (Primary)  2. Essential hypertension  3. Pure hyperglyceridemia  4. History of primary IgA nephropathy    Labs reviewed.   Lipids improved this year.  Consider heart scan  BP acceptable - continue current meds.     The patient indicates understanding of these issues and agrees to the plan.  Reinforced healthy diet, lifestyle, and exercise.      Return in 1 year (on 5/13/2026).     Leticia Gray MD    Supplementary Documentation:   General Health:  In the past six months, have you lost more than 10 pounds without trying?: 2 - No  Has your  appetite been poor?: No  Type of Diet: Balanced  How does the patient maintain a good energy level?: Daily Walks  How would you describe your daily physical activity?: Moderate  How would you describe your current health state?: Good  How do you maintain positive mental well-being?: Social Interaction, Puzzles, Games, Visiting Friends, Visiting Family  On a scale of 0 to 10, with 0 being no pain and 10 being severe pain, what is your pain level?: 0 - (None)  In the past six months, have you experienced urine leakage?: 0-No  At any time do you feel concerned for the safety/well-being of yourself and/or your children, in your home or elsewhere?: No  Have you had any immunizations at another office such as Influenza, Hepatitis B, Tetanus, or Pneumococcal?: No       Parvin Payton's SCREENING SCHEDULE   Tests on this list are recommended by your physician but may not be covered, or covered at this frequency, by your insurer.   Please check with your insurance carrier before scheduling to verify coverage.   PREVENTATIVE SERVICES FREQUENCY &  COVERAGE DETAILS LAST COMPLETION DATE   Diabetes Screening    Fasting Blood Sugar /  Glucose    One screening every 12 months if never tested or if previously tested but not diagnosed with pre-diabetes   One screening every 6 months if diagnosed with pre-diabetes Lab Results   Component Value Date     (H) 05/10/2025        Cardiovascular Disease Screening    Lipid Panel  Cholesterol  Lipoprotein (HDL)  Triglycerides Covered every 5 years for all Medicare beneficiaries without apparent signs or symptoms of cardiovascular disease Lab Results   Component Value Date    CHOLEST 154 05/10/2025    HDL 41 05/10/2025    LDL 92 05/10/2025    TRIG 114 05/10/2025         Electrocardiogram (EKG)   Covered if needed at Welcome to Medicare, and non-screening if indicated for medical reasons 05/30/2024      Ultrasound Screening for Abdominal Aortic Aneurysm (AAA) Covered once in a lifetime  for one of the following risk factors    Men who are 65-75 years old and have ever smoked    Anyone with a family history -     Colorectal Cancer Screening  Covered for ages 50-85; only need ONE of the following:    Colonoscopy   Covered every 10 years    Covered every 2 years if patient is at high risk or previous colonoscopy was abnormal 09/05/2024    Health Maintenance   Topic Date Due    Colorectal Cancer Screening  09/05/2031       Flexible Sigmoidoscopy   Covered every 4 years -    Fecal Occult Blood Test Covered annually -   Bone Density Screening    Bone density screening    Covered every 2 years after age 65 if diagnosed with risk of osteoporosis or estrogen deficiency.    Covered yearly for long-term glucocorticoid medication use (Steroids) Last Dexa Scan:    XR DEXA BONE DENSITOMETRY (CPT=77080) 05/14/2024      No recommendations at this time   Pap and Pelvic    Pap   Covered every 2 years for women at normal risk; Annually if at high risk 11/09/2020  No recommendations at this time    Chlamydia Annually if high risk -  No recommendations at this time   Screening Mammogram    Mammogram     Recommend annually for all female patients aged 40 and older    One baseline mammogram covered for patients aged 35-39 05/14/2024    Health Maintenance   Topic Date Due    Mammogram  05/14/2025       Immunizations    Influenza Covered once per flu season  Please get every year 10/05/2024  No recommendations at this time    Pneumococcal Each vaccine (Vxtnvds09 & Uxjyiglxn57) covered once after 65 Prevnar 13: -    Luvtqekpd80: -     No recommendations at this time    Hepatitis B One screening covered for patients with certain risk factors   11/29/2006  No recommendations at this time    Tetanus Toxoid Not covered by Medicare Part B unless medically necessary (cut with metal); may be covered with your pharmacy prescription benefits -    Tetanus, Diptheria and Pertusis TD and TDaP Not covered by Medicare Part B -  No  recommendations at this time    Zoster Not covered by Medicare Part B; may be covered with your pharmacy  prescription benefits 12/04/2013  No recommendations at this time     Annual Monitoring of Persistent Medications (ACE/ARB, digoxin diuretics, anticonvulsants)    Potassium Annually Lab Results   Component Value Date    K 4.5 05/10/2025         Creatinine   Annually Lab Results   Component Value Date    CREATSERUM 1.17 (H) 05/10/2025         BUN Annually Lab Results   Component Value Date    BUN 20 05/10/2025       Drug Serum Conc Annually No results found for: \"DIGOXIN\", \"DIG\", \"VALP\"

## 2025-06-17 ENCOUNTER — HOSPITAL ENCOUNTER (OUTPATIENT)
Dept: MAMMOGRAPHY | Age: 70
Discharge: HOME | End: 2025-06-17
Attending: FAMILY MEDICINE
Payer: MEDICARE

## 2025-06-17 DIAGNOSIS — Z12.31 VISIT FOR SCREENING MAMMOGRAM: ICD-10-CM

## 2025-06-17 PROCEDURE — 77063 BREAST TOMOSYNTHESIS BI: CPT | Performed by: FAMILY MEDICINE

## 2025-06-17 PROCEDURE — 77067 SCR MAMMO BI INCL CAD: CPT | Performed by: FAMILY MEDICINE

## (undated) DEVICE — 1010 S-DRAPE TOWEL DRAPE 10/BX: Brand: STERI-DRAPE™

## (undated) DEVICE — 2.8MM QUICK RELEASE DRILL: Brand: ACUMED

## (undated) DEVICE — C-ARM: Brand: UNBRANDED

## (undated) DEVICE — STERILE POLYISOPRENE POWDER-FREE SURGICAL GLOVES WITH EMOLLIENT COATING: Brand: PROTEXIS

## (undated) DEVICE — PADDING CAST COTTON STER 3

## (undated) DEVICE — ADHESIVE MASTISOL 2/3CC VL

## (undated) DEVICE — OLECRANON PLT PROX TARGETING GUIDE: Brand: ACUMED

## (undated) DEVICE — SUTURE VICRYL 0 CP-2

## (undated) DEVICE — UNDYED BRAIDED (POLYGLACTIN 910), SYNTHETIC ABSORBABLE SUTURE: Brand: COATED VICRYL

## (undated) DEVICE — CONVERTORS STOCKINETTE: Brand: CONVERTORS

## (undated) DEVICE — DRAPE,U/SHT,SPLIT,FILM,60X84,STERILE: Brand: MEDLINE

## (undated) DEVICE — STERILE POLYISOPRENE POWDER-FREE SURGICAL GLOVES: Brand: PROTEXIS

## (undated) DEVICE — UPPER EXTREMITY CDS-LF: Brand: MEDLINE INDUSTRIES, INC.

## (undated) DEVICE — 2.8 MM X 5 IN QUICK RELEASE DRILL: Brand: ACUMED

## (undated) DEVICE — SPLINT PRECUT SYNTH 5X30

## (undated) DEVICE — STRETCH BANDAGE ROLL: Brand: DERMACEA

## (undated) DEVICE — 2.0MM QUICK RELEASE DRILL: Brand: ACUMED

## (undated) DEVICE — OCCLUSIVE GAUZE STRIP OVERWRAP,3% BISMUTH TRIBROMOPHENATE IN PETROLATUM BLEND: Brand: XEROFORM

## (undated) DEVICE — Device

## (undated) DEVICE — 3.5MM X 18MM NON-LOCKING HEXALOBE SCREW
Type: IMPLANTABLE DEVICE | Site: ELBOW | Status: NON-FUNCTIONAL
Brand: ACUMED
Removed: 2021-06-11

## (undated) DEVICE — SCD SLEEVE KNEE HI BLEND

## (undated) DEVICE — VIOLET BRAIDED (POLYGLACTIN 910), SYNTHETIC ABSORBABLE SUTURE: Brand: COATED VICRYL

## (undated) DEVICE — SUPER SPONGES,MEDIUM: Brand: KERLIX

## (undated) DEVICE — CO/CA COUNTERSINK: Brand: ACUMED

## (undated) DEVICE — DISPOSABLE TOURNIQUET CUFF SINGLE BLADDER, DUAL PORT AND QUICK CONNECT CONNECTOR: Brand: COLOR CUFF

## (undated) DEVICE — HOOK LOCK LATEX FREE ELASTIC BANDAGE 4INX5YD

## (undated) DEVICE — PROXIMATE SKIN STAPLERS (35 WIDE) CONTAINS 35 STAINLESS STEEL STAPLES (FIXED HEAD): Brand: PROXIMATE

## (undated) DEVICE — 3.5MM X 8MM NON-LOCKING HEXALOBE SCREW
Type: IMPLANTABLE DEVICE | Site: ELBOW | Status: NON-FUNCTIONAL
Brand: ACUMED
Removed: 2021-06-11

## (undated) DEVICE — SUTURE VICRYL 2-0 CP-1

## (undated) DEVICE — SOL  .9 1000ML BTL

## (undated) DEVICE — GAUZE SPONGES,USP TYPE VII GAUZE, 12 PLY: Brand: CURITY

## (undated) DEVICE — GOWN SURG AERO CHROME XXL

## (undated) DEVICE — PADDING CAST SOFT ROLL 4\"

## (undated) NOTE — MR AVS SNAPSHOT
After Visit Summary   11/9/2020    Earma Needles    MRN: [id number]           Visit Information     Date & Time  11/9/2020  1:30 PM Provider  Martir Tsai PA-C [place] [id number], [id number]  [address], [place]  Dept.  Phone  [phone] If you receive a survey from [employer], please take a few minutes to complete it and provide feedback. We strive to deliver the best patient experience and are looking for ways to make improvements. Your feedback will help us do so.  For more information EMERGENCY ROOM Life-threatening emergencies needing immediate intervention at a hospital emergency room.  Average cost  $2,300*   *Cost varies based on your insurance coverage  For more information about hours, locations or appointment options available at

## (undated) NOTE — LETTER
Patient Name: Melani Casper  YOB: 1955          MRN number:  [id number]  Date:  9/20/2021  Referring Physician:  Kayley Silva    Discharge Summary  Initial Functional Outcome Score 45/100  Final Functional Outcome Score 65/100  Number of Vis this letter via fax as soon as possible to [phone]. I certify the need for these services furnished under this plan of treatment and while under my care.     X___________________________________________________ Date____________________    [facility]

## (undated) NOTE — MR AVS SNAPSHOT
[address] [facility] [address]  [facility],  [address] [facility] [id number]               Thank you for choosing us for your health care visit with Bernice Gibson PA-C.   We are glad to serve you and happy to provide you with [facility] Where to Get Your Medications      These medications were sent to [facility] [address], [address] [facility] [address], [phone], [address] [facility] [address], [place] [address]     Phone:  [phone]

## (undated) NOTE — LETTER
Patient Name: Jane Richard  YOB: 1955          MRN :  [id number]  Date:  8/16/2021  Referring Physician:  Marylu Otero    Progress Summary  Pt has attended 5 visits in Occupational Therapy.    Subjective: \"It's about the same, wish I could possible to [phone]. I certify the need for these services furnished under this plan of treatment and while under my care.     X___________________________________________________ Date____________________    Certification From: 6/49/7250  To:11/14/20

## (undated) NOTE — LETTER
Patient Name: Charlene Boeck  YOB: 1955          MRN :  [id number]  Date:  7/29/2021  Referring Physician:  Ly Morin    Progress Summary  Pt has attended 15,  visits in Physical Therapy.  Rosita White reports feeling 90% improvement in elb Thank you for your referral. If you have any questions, please contact me at Dept: [phone].     Sincerely,  Electronically signed by therapist: Cheyanne Lala PT    Physician's certification required: Yes  Please co-sign or sign and return this letter

## (undated) NOTE — LETTER
Patient Name: Kate Mena  YOB: 1955          MRN :  [id number]  Date:  8/12/2021  Referring Physician:  Isrrael Rowe    Discharge Summary  Pt has attended 16 visits in Physical Therapy.  Saad Wagner reports feeling 95% improvement in left and while under my care.     X___________________________________________________ Date____________________    Certification From: 3/20/7094  To:11/10/2021

## (undated) NOTE — MR AVS SNAPSHOT
[facility] [address] [facility]  [address] [facility]  [place] [id number] [phone]               Thank you for choosing us for your health care visit with JORDAN Burton. We are glad to serve you and happy to provide you with this summary of your visit.   Peter A medical evaluation can help find the cause of your sore throat. It can also help your healthcare provider choose the best treatment for you. The evaluation may include a health history, physical exam, and diagnostic tests.   Health history  Your healthcar · Gargle with warm saltwater (1 teaspoon of salt to 8 ounces of warm water). · Use a humidifier to keep air moist and relieve throat dryness. · Try over-the-counter pain relievers such as acetaminophen or ibuprofen.  Use as directed, and don’t exceed the · A skin rash, hives, or wheezing develops. Any of these could signal an allergic reaction to antibiotics. · Symptoms don’t improve within a week. · Symptoms don’t improve within 2 to 3 days of starting antibiotics.    Date Last Reviewed: 10/1/2016  © 200 drinking 6 to 8 glasses of fluids per day (water, soft drinks, juices, tea, or soup). Extra fluids will help loosen secretions in the nose and lungs.   · Over-the-counter cold medicines will not shorten the length of time you’re sick, but they may be helpfu - [address] [place] [address], [phone], [phone] [address], [place] [address]     Phone:  [phone] - amoxicillin 500 MG Caps            Results of Recent Testing       MyChart     Visit MyChart  You can Get your heart pumping – brisk walking, biking, swimming Even 10 minute increments are effective and add up over the week   2 ½ hours per week – spread out over time Use a bahman to keep you motivated   Don’t forget strength training with weights and resist

## (undated) NOTE — MR AVS SNAPSHOT
[facility] [place]  [place],  [address]  [address] [place] [id number] [phone]               Thank you for choosing us for your [facility].   We are glad to serve you and happy to provide you with this Take 1 capsule (200 mg total) by mouth 3 (three) times daily as needed for cough. Commonly known as:  TESSALON           lisinopril 20 MG Tabs   Take 1 tablet (20 mg total) by mouth once daily.    What changed:  when to take this   Commonly known as:  SYDNEY

## (undated) NOTE — LETTER
Shara Flanagan 89  [address] [facility] [address] [address], [address] [facility]  Authorization for Surgical Operation and Procedure     Date:___________                                                                                                         Time:__________ risks that can occur: fever and allergic reactions, hemolytic reactions, transmission of diseases such as Hepatitis, AIDS and Cytomegalovirus (CMV) and fluid overload.   In the event that I wish to have an autologous transfusion of my own blood, or a direct determine when the applicable recovery period ends for purposes of reinstating the DNAR order.   10. Patients having a sterilization procedure: I understand that if the procedure is successful the results will be permanent and it will therefore be impossibl doctor) to give me medicine and do additional procedures as necessary.  Some examples are: Starting or using an “IV” to give me medicine, fluids or blood during my procedure, and having a breathing tube placed to help me breathe when I’m asleep (intubation) understand that rare but potential complications include headache, bleeding, infection, seizure, irregular heart rhythms, and nerve injury.     I can change my mind about having anesthesia services at any time before I get the medicine.    _________________

## (undated) NOTE — MR AVS SNAPSHOT
[facility]  [place]  [address]  [address] [facility] [address] [phone]               [facility] you for choosing [facility]   We are glad to serve you and happy to provide you with this MyChart     Visit Exinda  You can access your MyChart to more actively manage your health care and view more details from this visit by going to [email or web address].   If you've recently had a stay at the Hospital you can access your discharge ins